# Patient Record
Sex: FEMALE | Race: OTHER | HISPANIC OR LATINO | URBAN - METROPOLITAN AREA
[De-identification: names, ages, dates, MRNs, and addresses within clinical notes are randomized per-mention and may not be internally consistent; named-entity substitution may affect disease eponyms.]

---

## 2017-02-09 ENCOUNTER — EMERGENCY (EMERGENCY)
Facility: HOSPITAL | Age: 27
LOS: 1 days | Discharge: OTHER INSTITUTION | End: 2017-02-09
Attending: STUDENT IN AN ORGANIZED HEALTH CARE EDUCATION/TRAINING PROGRAM
Payer: COMMERCIAL

## 2017-02-09 VITALS
OXYGEN SATURATION: 97 % | TEMPERATURE: 98 F | RESPIRATION RATE: 20 BRPM | DIASTOLIC BLOOD PRESSURE: 82 MMHG | HEART RATE: 102 BPM | HEIGHT: 65 IN | SYSTOLIC BLOOD PRESSURE: 137 MMHG | WEIGHT: 199.96 LBS

## 2017-02-09 DIAGNOSIS — F25.9 SCHIZOAFFECTIVE DISORDER, UNSPECIFIED: ICD-10-CM

## 2017-02-09 DIAGNOSIS — R06.00 DYSPNEA, UNSPECIFIED: ICD-10-CM

## 2017-02-09 DIAGNOSIS — Z88.2 ALLERGY STATUS TO SULFONAMIDES: ICD-10-CM

## 2017-02-09 DIAGNOSIS — J45.909 UNSPECIFIED ASTHMA, UNCOMPLICATED: ICD-10-CM

## 2017-02-09 DIAGNOSIS — Z88.0 ALLERGY STATUS TO PENICILLIN: ICD-10-CM

## 2017-02-09 DIAGNOSIS — Z88.8 ALLERGY STATUS TO OTHER DRUGS, MEDICAMENTS AND BIOLOGICAL SUBSTANCES STATUS: ICD-10-CM

## 2017-02-09 DIAGNOSIS — F41.9 ANXIETY DISORDER, UNSPECIFIED: ICD-10-CM

## 2017-02-09 DIAGNOSIS — R11.10 VOMITING, UNSPECIFIED: ICD-10-CM

## 2017-02-09 DIAGNOSIS — N39.0 URINARY TRACT INFECTION, SITE NOT SPECIFIED: ICD-10-CM

## 2017-02-09 PROCEDURE — 90792 PSYCH DIAG EVAL W/MED SRVCS: CPT | Mod: GT

## 2017-02-09 PROCEDURE — 99284 EMERGENCY DEPT VISIT MOD MDM: CPT | Mod: 25

## 2017-02-09 NOTE — ED ADULT TRIAGE NOTE - CHIEF COMPLAINT QUOTE
have a lot of mental problem feel I cant breath good .  I would like to speak with someone  denies s/i denies h/i.   I am taking my medication.  my feet hurt

## 2017-02-10 ENCOUNTER — INPATIENT (INPATIENT)
Facility: HOSPITAL | Age: 27
LOS: 3 days | Discharge: HOME | End: 2017-02-14
Attending: PSYCHIATRY & NEUROLOGY | Admitting: PSYCHIATRY & NEUROLOGY
Payer: MEDICAID

## 2017-02-10 VITALS
HEART RATE: 96 BPM | TEMPERATURE: 98 F | RESPIRATION RATE: 20 BRPM | SYSTOLIC BLOOD PRESSURE: 104 MMHG | DIASTOLIC BLOOD PRESSURE: 71 MMHG | OXYGEN SATURATION: 99 %

## 2017-02-10 VITALS — WEIGHT: 263.45 LBS | HEIGHT: 65 IN | TEMPERATURE: 98 F

## 2017-02-10 DIAGNOSIS — F29 UNSPECIFIED PSYCHOSIS NOT DUE TO A SUBSTANCE OR KNOWN PHYSIOLOGICAL CONDITION: ICD-10-CM

## 2017-02-10 DIAGNOSIS — F25.9 SCHIZOAFFECTIVE DISORDER, UNSPECIFIED: ICD-10-CM

## 2017-02-10 LAB
ALBUMIN SERPL ELPH-MCNC: 4.2 G/DL — SIGNIFICANT CHANGE UP (ref 3.3–5.2)
ALP SERPL-CCNC: 112 U/L — SIGNIFICANT CHANGE UP (ref 40–120)
ALT FLD-CCNC: 24 U/L — SIGNIFICANT CHANGE UP
ANION GAP SERPL CALC-SCNC: 14 MMOL/L — SIGNIFICANT CHANGE UP (ref 5–17)
APAP SERPL-MCNC: <7.5 UG/ML — LOW (ref 10–26)
APPEARANCE UR: ABNORMAL
AST SERPL-CCNC: 22 U/L — SIGNIFICANT CHANGE UP
BASOPHILS # BLD AUTO: 0 K/UL — SIGNIFICANT CHANGE UP (ref 0–0.2)
BASOPHILS NFR BLD AUTO: 0.5 % — SIGNIFICANT CHANGE UP (ref 0–2)
BILIRUB SERPL-MCNC: 0.2 MG/DL — LOW (ref 0.4–2)
BILIRUB UR-MCNC: NEGATIVE — SIGNIFICANT CHANGE UP
BUN SERPL-MCNC: 8 MG/DL — SIGNIFICANT CHANGE UP (ref 8–20)
CALCIUM SERPL-MCNC: 9.2 MG/DL — SIGNIFICANT CHANGE UP (ref 8.6–10.2)
CHLORIDE SERPL-SCNC: 98 MMOL/L — SIGNIFICANT CHANGE UP (ref 98–107)
CO2 SERPL-SCNC: 23 MMOL/L — SIGNIFICANT CHANGE UP (ref 22–29)
COLOR SPEC: YELLOW — SIGNIFICANT CHANGE UP
CREAT SERPL-MCNC: 0.76 MG/DL — SIGNIFICANT CHANGE UP (ref 0.5–1.3)
DIFF PNL FLD: ABNORMAL
EOSINOPHIL # BLD AUTO: 0.2 K/UL — SIGNIFICANT CHANGE UP (ref 0–0.5)
EOSINOPHIL NFR BLD AUTO: 1.7 % — SIGNIFICANT CHANGE UP (ref 0–6)
ETHANOL SERPL-MCNC: <10 MG/DL — SIGNIFICANT CHANGE UP
GLUCOSE SERPL-MCNC: 109 MG/DL — SIGNIFICANT CHANGE UP (ref 70–115)
GLUCOSE UR QL: NEGATIVE MG/DL — SIGNIFICANT CHANGE UP
HCG UR QL: NEGATIVE — SIGNIFICANT CHANGE UP
HCT VFR BLD CALC: 37.1 % — SIGNIFICANT CHANGE UP (ref 37–47)
HGB BLD-MCNC: 12.4 G/DL — SIGNIFICANT CHANGE UP (ref 12–16)
KETONES UR-MCNC: NEGATIVE — SIGNIFICANT CHANGE UP
LEUKOCYTE ESTERASE UR-ACNC: ABNORMAL
LYMPHOCYTES # BLD AUTO: 28.8 % — SIGNIFICANT CHANGE UP (ref 20–55)
LYMPHOCYTES # BLD AUTO: 3.2 K/UL — SIGNIFICANT CHANGE UP (ref 1–4.8)
MAGNESIUM SERPL-MCNC: 2.1 MG/DL — SIGNIFICANT CHANGE UP (ref 1.8–2.5)
MCHC RBC-ENTMCNC: 27.7 PG — SIGNIFICANT CHANGE UP (ref 27–31)
MCHC RBC-ENTMCNC: 33.4 G/DL — SIGNIFICANT CHANGE UP (ref 32–36)
MCV RBC AUTO: 83 FL — SIGNIFICANT CHANGE UP (ref 81–99)
MONOCYTES # BLD AUTO: 0.7 K/UL — SIGNIFICANT CHANGE UP (ref 0–0.8)
MONOCYTES NFR BLD AUTO: 6.7 % — SIGNIFICANT CHANGE UP (ref 3–10)
NEUTROPHILS # BLD AUTO: 6.8 K/UL — SIGNIFICANT CHANGE UP (ref 1.8–8)
NEUTROPHILS NFR BLD AUTO: 62.1 % — SIGNIFICANT CHANGE UP (ref 37–73)
NITRITE UR-MCNC: NEGATIVE — SIGNIFICANT CHANGE UP
PCP SPEC-MCNC: SIGNIFICANT CHANGE UP
PH UR: 6 — SIGNIFICANT CHANGE UP (ref 4.8–8)
PLATELET # BLD AUTO: 411 K/UL — HIGH (ref 150–400)
POTASSIUM SERPL-MCNC: 3.8 MMOL/L — SIGNIFICANT CHANGE UP (ref 3.5–5.3)
POTASSIUM SERPL-SCNC: 3.8 MMOL/L — SIGNIFICANT CHANGE UP (ref 3.5–5.3)
PROT SERPL-MCNC: 8.3 G/DL — SIGNIFICANT CHANGE UP (ref 6.6–8.7)
PROT UR-MCNC: 15 MG/DL
RBC # BLD: 4.47 M/UL — SIGNIFICANT CHANGE UP (ref 4.4–5.2)
RBC # FLD: 13.8 % — SIGNIFICANT CHANGE UP (ref 11–15.6)
SALICYLATES SERPL-MCNC: <2 MG/DL — LOW (ref 10–20)
SODIUM SERPL-SCNC: 135 MMOL/L — SIGNIFICANT CHANGE UP (ref 135–145)
SP GR SPEC: 1.02 — SIGNIFICANT CHANGE UP (ref 1.01–1.02)
TSH SERPL-MCNC: 4.78 UIU/ML — HIGH (ref 0.27–4.2)
UROBILINOGEN FLD QL: NEGATIVE MG/DL — SIGNIFICANT CHANGE UP
WBC # BLD: 11 K/UL — HIGH (ref 4.8–10.8)
WBC # FLD AUTO: 11 K/UL — HIGH (ref 4.8–10.8)

## 2017-02-10 PROCEDURE — 93010 ELECTROCARDIOGRAM REPORT: CPT

## 2017-02-10 PROCEDURE — 84443 ASSAY THYROID STIM HORMONE: CPT

## 2017-02-10 PROCEDURE — 83735 ASSAY OF MAGNESIUM: CPT

## 2017-02-10 PROCEDURE — 80307 DRUG TEST PRSMV CHEM ANLYZR: CPT

## 2017-02-10 PROCEDURE — 81001 URINALYSIS AUTO W/SCOPE: CPT

## 2017-02-10 PROCEDURE — 99285 EMERGENCY DEPT VISIT HI MDM: CPT | Mod: 25

## 2017-02-10 PROCEDURE — 85027 COMPLETE CBC AUTOMATED: CPT

## 2017-02-10 PROCEDURE — 93005 ELECTROCARDIOGRAM TRACING: CPT

## 2017-02-10 PROCEDURE — 80053 COMPREHEN METABOLIC PANEL: CPT

## 2017-02-10 PROCEDURE — 81025 URINE PREGNANCY TEST: CPT

## 2017-02-10 PROCEDURE — 71046 X-RAY EXAM CHEST 2 VIEWS: CPT

## 2017-02-10 PROCEDURE — T1013: CPT

## 2017-02-10 PROCEDURE — 71020: CPT | Mod: 26

## 2017-02-10 RX ORDER — BENZTROPINE MESYLATE 1 MG
1 TABLET ORAL
Qty: 0 | Refills: 0 | COMMUNITY

## 2017-02-10 RX ORDER — NITROFURANTOIN MACROCRYSTAL 50 MG
100 CAPSULE ORAL ONCE
Qty: 0 | Refills: 0 | Status: COMPLETED | OUTPATIENT
Start: 2017-02-10 | End: 2017-02-10

## 2017-02-10 RX ORDER — HYDROXYZINE HCL 10 MG
25 TABLET ORAL EVERY 4 HOURS
Qty: 0 | Refills: 0 | Status: DISCONTINUED | OUTPATIENT
Start: 2017-02-10 | End: 2017-02-14

## 2017-02-10 RX ORDER — CITALOPRAM 10 MG/1
40 TABLET, FILM COATED ORAL DAILY
Qty: 0 | Refills: 0 | Status: DISCONTINUED | OUTPATIENT
Start: 2017-02-10 | End: 2017-02-14

## 2017-02-10 RX ADMIN — Medication 100 MILLIGRAM(S): at 03:23

## 2017-02-10 RX ADMIN — Medication 1 MILLIGRAM(S): at 03:13

## 2017-02-10 NOTE — ED BEHAVIORAL HEALTH NOTE - BEHAVIORAL HEALTH NOTE
Collateral Contact: Rodrigo Gabriel    [facility] spoke with Rodrigo Murphyrez using [employer] ID#[id number].    -NUMBER: [phone]    -RELATIONSHIP: Boyfriend    -RELIABILITY: Boyfriend lives with patient and has knowledge of history and some presenting issues. Note that boyfriend was not aware of recent cutting or suicidal thoughts, and did not believe that this has occurred for 1.5 years.    -OPINION RE PATIENT RELIABILITY: No concerns noted.  -OPINION RE CONCERN FOR DANGEROUSNESS: Boyfriend reports that patient has seemed more depressed recently, but that patient sees [facility] outpatient provider and has not reported specific stressors.    -AFTERCARE ROLE: Boyfriend will continue to offer support and encouragement to patient.    -PSYCHOEDUCATION: Reviewed role of Emergency department, nature of involuntary vs. voluntary hospitalization, support groups for caregivers.    CORE HISTORY PROVIDED BY: Mahsa  -DEMOGRAPHICS: Patient is a 26 year old  female, domiciled with boyfriend, no children, non-caregiver, unemployed, not attending school.     -DEPENDENTS: None.    -HPI/PAST PSYCH: Patient is diagnosed with Schizoaffective Disorder. Patient sees [facility] Psychiatrist Dr. Mccartney, last appointment was 2/7. No history of inpatient psych hospitalizations. Per boyfriend no trauma history. Patient has history of AH, with voices telling her to hurt herself. No substance use. No history of aggression. No delusions. Per boyfriend, when patient gets depression she cries and “squeezes her toes.”    -SUICIDALITY: Patient has history of suicidal thoughts and cutting. Patient has history of AH, with voices telling her to hurt herself. Boyfriend denies any previous suicide attempts, however patient reported a suicide attempt last year. Boyfriend also reports that the last time patient cut or expressed suicidal thoughts was 1.5 years ago, but patient reported more recent cutting to Telepsychiatry Attending.     -VIOLENCE: None.    -ARRESTS: None.    -SUBSTANCE: None.    -MEDICAL: Asthma, although boyfriend reports that it is controlled and patient no longer takes medication for this.    -MEDICATIONS: Per patient Haldol deconate, Citalopram, and Melatonin.    -TODAY’S ED VISIT: Patient presented to ED with complaint of anxiety.     -SOCIAL HISTORY:  Patient is unemployed. Boyfriend reports no recent significant stressors. No access to guns or weapons.    DISPOSITION: Patient to be transferred on a voluntary 9.13 basis. [facility] inquired with [facility] about bed availability and was informed no beds are available at this time. [facility] spoke with VARUN Caal who confirmed bed availability on Low4 ([phone]).    I have discussed the above information with Telepsychiatry Attending Dr. Segura Collateral Contact: Rodrigo Gabriel    [facility] spoke with Rodrigo Murphyrez using [employer] ID#[id number].    -NUMBER: [phone]    -RELATIONSHIP: Boyfriend    -RELIABILITY: Boyfriend lives with patient and has knowledge of history and some presenting issues. Note that boyfriend was not aware of recent cutting or suicidal thoughts, and did not believe that this has occurred for 1.5 years.    -OPINION RE PATIENT RELIABILITY: No concerns noted.  -OPINION RE CONCERN FOR DANGEROUSNESS: Boyfriend reports that patient has seemed more depressed recently, but that patient sees [facility] outpatient provider and has not reported specific stressors.    -AFTERCARE ROLE: Boyfriend will continue to offer support and encouragement to patient.    -PSYCHOEDUCATION: Reviewed role of Emergency department, nature of involuntary vs. voluntary hospitalization, support groups for caregivers.    CORE HISTORY PROVIDED BY: Mahsa  -DEMOGRAPHICS: Patient is a 26 year old  female, domiciled with boyfriend, no children, non-caregiver, unemployed, not attending school.     -DEPENDENTS: None.    -HPI/PAST PSYCH: Patient is diagnosed with Schizoaffective Disorder. Patient sees [facility] Psychiatrist Dr. Mccartney, last appointment was 2/7. No history of inpatient psych hospitalizations. Per boyfriend no trauma history. Patient has history of AH, with voices telling her to hurt herself. No substance use. No history of aggression. No delusions. Per boyfriend, when patient gets depression she cries and “squeezes her toes.”    -SUICIDALITY: Patient has history of suicidal thoughts and cutting. Patient has history of AH, with voices telling her to hurt herself. Boyfriend denies any previous suicide attempts, however patient reported a suicide attempt last year. Boyfriend also reports that the last time patient cut or expressed suicidal thoughts was 1.5 years ago, but patient reported more recent cutting to Telepsychiatry Attending.     -VIOLENCE: None.    -ARRESTS: None.    -SUBSTANCE: None.    -MEDICAL: Asthma, although boyfriend reports that it is controlled and patient no longer takes medication for this.    -MEDICATIONS: Per patient Haldol deconate, Citalopram, and Melatonin.    -TODAY’S ED VISIT: Patient presented to ED with complaint of anxiety.     -SOCIAL HISTORY:  Patient is unemployed. Boyfriend reports no recent significant stressors. No access to guns or weapons.    DISPOSITION: Patient to be transferred on a voluntary 9.13 basis. [facility] inquired with [facility] about bed availability and was informed no beds are available at this time. [facility] spoke with [facility] who confirmed bed availability on Low4 ([phone]). # for Low4 provided to [facility] RN.    I have discussed the above information with Telepsychiatry Attending Dr. Segura

## 2017-02-10 NOTE — ED BEHAVIORAL HEALTH ASSESSMENT NOTE - MOOD
Date: 5/1/2024    Patient Name: Amy Rosario          To Whom it may concern:    This letter has been written at the patient's request. The above patient was seen at [facility] for treatment of a medical condition.     .    The patient may return to work light duty with the following limitations limited walking, allow for resting and elevating affected extremity.  .        Sincerely,    Adeola Quintero DPM       Depressed

## 2017-02-10 NOTE — ED BEHAVIORAL HEALTH ASSESSMENT NOTE - HPI (INCLUDE ILLNESS QUALITY, SEVERITY, DURATION, TIMING, CONTEXT, MODIFYING FACTORS, ASSOCIATED SIGNS AND SYMPTOMS)
This is a 26 y.o. with pph of schizoaffective disorder, domiciled with boyfriend she is in outpatient treatment with Dr Mccartney at [facility], no past psychiatric hospitalizations , 2 past suicide attempts, last one 5 months ago with cutting wrist,  presents today with anxiety, with auditory hallucinations and suicidal ideations.     Patient states that she had a panic attack at 8 pm and she has had them multiple tomes in the past, but she had suicidal ideations today, and heard a voice telling her to hurt herself and that people don't like her.  She reports depression over the past 2 weeks, with decrease in energy and increase in sleep and appetite.  She states that she received Haldol deconate on Tuesday.  She states that she usually helps her niece in the morning but has struggled the past week.  She states that she has a history of 2 suicide attempts a year ago she took 7 pills of haldol, and went to sleep , and never told anybody.  5 months ago she attempted to cut her wrist to kill herself and never reported attempt.  At this time patient continues to have passive suicidal ideations.  Patient agrees to voluntarily sign herself into the hospital for Depressive symptoms, at this time she is an imminent  danger to herself.

## 2017-02-10 NOTE — ED BEHAVIORAL HEALTH ASSESSMENT NOTE - SUMMARY
26 y.o. female with PPH of schizoaffective disorder treated outpatient for the past year, history of no past psychiatric hospitalizations.  Patient has worsening of depression with suicidal ideations, with command auditory hallucinations. She has passive suicidal ideations at this time.

## 2017-02-10 NOTE — ED PROVIDER NOTE - CARE PLAN
Principal Discharge DX:	Anxiety  Secondary Diagnosis:	UTI (urinary tract infection) Principal Discharge DX:	Schizoaffective disorder, unspecified type  Secondary Diagnosis:	UTI (urinary tract infection)

## 2017-02-10 NOTE — ED BEHAVIORAL HEALTH NOTE - BEHAVIORAL HEALTH NOTE
ALIDA telephoned [employer] [facility] [phone] and spoke with Narcisa Landrum for 2 days, Auth#[id number]. Follow up with Oksana Plunkett [phone].

## 2017-02-10 NOTE — ED ADULT NURSE REASSESSMENT NOTE - NS ED NURSE REASSESS COMMENT FT1
pt received asleep ,easily awakened, breathing even and unlaboured, pt answers questions appropriately. pt states she feels a little better. no anxiety noted at this time , pt awaiting transfer to [facility] [place]. Environment checked to protect pts safety ,will continue to monitor
Request for tele pysch faxed and confirmed received by Reanna CALLAWAY.

## 2017-02-10 NOTE — ED BEHAVIORAL HEALTH ASSESSMENT NOTE - DESCRIPTION
calm and ioana Obese, UTI patient lives with boyfriend of 2 years, has a niece, no history of abuse.

## 2017-02-10 NOTE — ED PROVIDER NOTE - NS ED MD SCRIBE ATTENDING SCRIBE SECTIONS
DISPOSITION/PAST MEDICAL/SURGICAL/SOCIAL HISTORY/REVIEW OF SYSTEMS/HIV/PHYSICAL EXAM/HISTORY OF PRESENT ILLNESS/VITAL SIGNS( Pullset)

## 2017-02-10 NOTE — ED ADULT NURSE NOTE - OBJECTIVE STATEMENT
Pt brought in by boyfriend. AOx3. Pt complaining of SOB, HA, insomnia, toes cramping. Pt reports having these symptoms in the past while having panic attacks. Pt has a medical hx of asthma. Pt reports having A/V/H earlier on today. Pt reports hearing voices saying "To kill yourself". Pt reports on seeing shadows. Pt has   A/V/H weekly.  Pt has a hx of schizoaffective disorder, bipolar, and depression.  Pt reports that "she feels like she is going to die." which is what triggered her anxiety. Pt reports her medicine (Haldol injections) helps lessen her A/V/H symptoms.  Pt lives with her boyfriend and is currently unemployed. Pt has a hx of cutting wrists. Pt has a suicide attempt last year. Pt reports taking 17pills of Haldol and slept it off. Pt had a hx of anorexia for 7 years. Pt denies any current SI/HI. Will continue to monitor the pt for safety. Pt brought in by boyfriend. AOx3. Pt complaining of SOB, HA, insomnia, toes cramping. Pt reports having these symptoms in the past while having panic attacks. Pt has a medical hx of asthma. Pt reports having A/V/H earlier on today. Pt reports hearing voices saying "To kill yourself". Pt reports on seeing shadows. Pt has   A/V/H weekly.  Pt has a hx of schizoaffective disorder, bipolar, and depression.  Pt reports that "she feels like she is going to die." which is what triggered her anxiety. Pt reports her medicine (Haldol injections) helps lessen her A/V/H symptoms.  Pt lives with her boyfriend and is currently unemployed. Pt has a hx of cutting wrists. Pt has a suicide attempt last year. Pt reports taking 17pills of Haldol and slept it off. Pt had a hx of anorexia for 7 years Pt goes to [employer] [facility]  and sees . Pt denies any current SI/HI. Will continue to monitor the pt for safety.

## 2017-02-10 NOTE — ED PROVIDER NOTE - NEUROLOGICAL, MLM
Alert and oriented, no focal deficits, no motor or sensory deficits. CN II - XII intact, normal gait.

## 2017-02-10 NOTE — ED BEHAVIORAL HEALTH ASSESSMENT NOTE - RISK ASSESSMENT
Pt has protective factors of help seeking behavior, supportive boyfriend, although risk factors of worsening depression, history of suicide attempts, and command auditory hallucinations .

## 2017-02-10 NOTE — ED PROVIDER NOTE - OBJECTIVE STATEMENT
27 y/o female c/o difficulty breathing x 2 hours. Sx were 9/10 intensity at home but currently a 5/10 intensity. Pt also had 2 episodes of associated vomiting. Denies CP, abd pain, back pain. Pt is also requesting psychiatric evaluation for management of anxiety. Pt has h/o bipolar disorder, schizoaffective disorder, depression. She has been taking Haldol and other psychiatric medications but denies any anxiety-specific medications. No further complaints at this time. 27 y/o female c/o difficulty breathing x 2 hours. Sx were 9/10 intensity at home but currently a 5/10 intensity. Pt also had 2 episodes of associated vomiting. Denies CP, abd pain, back pain. Pt is also concerned about chronic cramping foot pains x 2 months. She says she has seen her PCP and told the pain just needs to be "stretched out." Pt is also requesting psychiatric evaluation for management of anxiety. Pt has h/o bipolar disorder, schizoaffective disorder, depression. She has been taking Haldol and other psychiatric medications but denies any anxiety-specific medications. No further complaints at this time. 25 y/o female c/o difficulty breathing x 2 hours; described as the air feeling heavy. Sx were 9/10 intensity at home but currently a 5/10 intensity. Pt also had 2 episodes of associated vomiting. Denies CP, abd pain, back pain. Pt is also concerned about chronic cramping foot pains x 2 months. She says she has seen her PCP and told the pain just needs to be "stretched out." Pt is also requesting psychiatric evaluation for management of anxiety. Pt has h/o bipolar disorder, schizoaffective disorder, depression. She has been taking Haldol and other psychiatric medications but denies any anxiety-specific medications. No further complaints at this time.

## 2017-02-10 NOTE — ED BEHAVIORAL HEALTH ASSESSMENT NOTE - OTHER PAST PSYCHIATRIC HISTORY (INCLUDE DETAILS REGARDING ONSET, COURSE OF ILLNESS, INPATIENT/OUTPATIENT TREATMENT)
outpatient treatment at [facility], began treatment 1 year ago. never had psychiatric hospitalization

## 2017-02-10 NOTE — ED PROVIDER NOTE - PROGRESS NOTE DETAILS
Patient noted with mild UTI. Lbs are as otherwise noted. Patient is cleared for discharge home or inpatient psychiatric admission. Awaiting Psychiatric consultation. Case discussed with Dr. Segura. Patient to sign in voluntary to assist with her condition. Patient accepted to [facility]. Accepting will be Dr. Duque.

## 2017-02-11 PROCEDURE — 99232 SBSQ HOSP IP/OBS MODERATE 35: CPT

## 2017-02-11 RX ADMIN — CITALOPRAM 40 MILLIGRAM(S): 10 TABLET, FILM COATED ORAL at 08:15

## 2017-02-12 PROCEDURE — 99232 SBSQ HOSP IP/OBS MODERATE 35: CPT

## 2017-02-12 RX ADMIN — CITALOPRAM 40 MILLIGRAM(S): 10 TABLET, FILM COATED ORAL at 08:44

## 2017-02-13 LAB
APPEARANCE UR: SIGNIFICANT CHANGE UP
BACTERIA # UR AUTO: SIGNIFICANT CHANGE UP
BILIRUB UR-MCNC: NEGATIVE — SIGNIFICANT CHANGE UP
BLOOD UR QL VISUAL: HIGH
COLOR SPEC: YELLOW — SIGNIFICANT CHANGE UP
GLUCOSE UR-MCNC: NEGATIVE — SIGNIFICANT CHANGE UP
KETONES UR-MCNC: NEGATIVE — SIGNIFICANT CHANGE UP
LEUKOCYTE ESTERASE UR-ACNC: HIGH
NITRITE UR-MCNC: NEGATIVE — SIGNIFICANT CHANGE UP
NON-SQ EPI CELLS # UR AUTO: 1 — SIGNIFICANT CHANGE UP
PH UR: 6 — SIGNIFICANT CHANGE UP (ref 4.6–8)
PROT UR-MCNC: 30 — HIGH
RBC CASTS # UR COMP ASSIST: >50 — HIGH (ref 0–?)
SP GR SPEC: 1.01 — SIGNIFICANT CHANGE UP (ref 1–1.03)
SQUAMOUS # UR AUTO: SIGNIFICANT CHANGE UP
UROBILINOGEN FLD QL: NORMAL E.U. — SIGNIFICANT CHANGE UP (ref 0.1–0.2)
WBC UR QL: SIGNIFICANT CHANGE UP (ref 0–?)

## 2017-02-13 RX ORDER — BENZTROPINE MESYLATE 1 MG
1 TABLET ORAL
Qty: 0 | Refills: 0 | Status: DISCONTINUED | OUTPATIENT
Start: 2017-02-13 | End: 2017-02-14

## 2017-02-13 RX ORDER — CITALOPRAM 10 MG/1
1 TABLET, FILM COATED ORAL
Qty: 0 | Refills: 0 | COMMUNITY
Start: 2017-02-13

## 2017-02-13 RX ORDER — BENZTROPINE MESYLATE 1 MG
1 TABLET ORAL ONCE
Qty: 0 | Refills: 0 | Status: COMPLETED | OUTPATIENT
Start: 2017-02-13 | End: 2017-02-13

## 2017-02-13 RX ORDER — CITALOPRAM 10 MG/1
30 TABLET, FILM COATED ORAL
Qty: 0 | Refills: 0 | COMMUNITY

## 2017-02-13 RX ORDER — ONDANSETRON 8 MG/1
4 TABLET, FILM COATED ORAL EVERY 6 HOURS
Qty: 0 | Refills: 0 | Status: DISCONTINUED | OUTPATIENT
Start: 2017-02-13 | End: 2017-02-14

## 2017-02-13 RX ORDER — ACETAMINOPHEN 500 MG
650 TABLET ORAL ONCE
Qty: 0 | Refills: 0 | Status: COMPLETED | OUTPATIENT
Start: 2017-02-13 | End: 2017-02-13

## 2017-02-13 RX ADMIN — ONDANSETRON 4 MILLIGRAM(S): 8 TABLET, FILM COATED ORAL at 21:32

## 2017-02-13 RX ADMIN — Medication 1 MILLIGRAM(S): at 17:39

## 2017-02-13 RX ADMIN — Medication 1 MILLIGRAM(S): at 20:26

## 2017-02-13 RX ADMIN — Medication 25 MILLIGRAM(S): at 19:20

## 2017-02-13 RX ADMIN — Medication 650 MILLIGRAM(S): at 22:20

## 2017-02-13 RX ADMIN — Medication 650 MILLIGRAM(S): at 21:28

## 2017-02-13 RX ADMIN — CITALOPRAM 40 MILLIGRAM(S): 10 TABLET, FILM COATED ORAL at 08:40

## 2017-02-14 VITALS — TEMPERATURE: 98 F

## 2017-02-14 LAB
ALBUMIN SERPL ELPH-MCNC: 3.9 G/DL — SIGNIFICANT CHANGE UP (ref 3.3–5)
ALP SERPL-CCNC: 96 U/L — SIGNIFICANT CHANGE UP (ref 40–120)
ALT FLD-CCNC: 30 U/L — SIGNIFICANT CHANGE UP (ref 4–33)
AST SERPL-CCNC: 23 U/L — SIGNIFICANT CHANGE UP (ref 4–32)
BASOPHILS # BLD AUTO: 0.03 K/UL — SIGNIFICANT CHANGE UP (ref 0–0.2)
BASOPHILS NFR BLD AUTO: 0.3 % — SIGNIFICANT CHANGE UP (ref 0–2)
BILIRUB SERPL-MCNC: 0.3 MG/DL — SIGNIFICANT CHANGE UP (ref 0.2–1.2)
BUN SERPL-MCNC: 6 MG/DL — LOW (ref 7–23)
CALCIUM SERPL-MCNC: 9.3 MG/DL — SIGNIFICANT CHANGE UP (ref 8.4–10.5)
CHLORIDE SERPL-SCNC: 102 MMOL/L — SIGNIFICANT CHANGE UP (ref 98–107)
CO2 SERPL-SCNC: 22 MMOL/L — SIGNIFICANT CHANGE UP (ref 22–31)
CREAT SERPL-MCNC: 0.69 MG/DL — SIGNIFICANT CHANGE UP (ref 0.5–1.3)
EOSINOPHIL # BLD AUTO: 0.14 K/UL — SIGNIFICANT CHANGE UP (ref 0–0.5)
EOSINOPHIL NFR BLD AUTO: 1.6 % — SIGNIFICANT CHANGE UP (ref 0–6)
GLUCOSE SERPL-MCNC: 104 MG/DL — HIGH (ref 70–99)
HCT VFR BLD CALC: 39.2 % — SIGNIFICANT CHANGE UP (ref 34.5–45)
HGB BLD-MCNC: 12.7 G/DL — SIGNIFICANT CHANGE UP (ref 11.5–15.5)
IMM GRANULOCYTES NFR BLD AUTO: 0.1 % — SIGNIFICANT CHANGE UP (ref 0–1.5)
LYMPHOCYTES # BLD AUTO: 2.71 K/UL — SIGNIFICANT CHANGE UP (ref 1–3.3)
LYMPHOCYTES # BLD AUTO: 31.5 % — SIGNIFICANT CHANGE UP (ref 13–44)
MCHC RBC-ENTMCNC: 27.5 PG — SIGNIFICANT CHANGE UP (ref 27–34)
MCHC RBC-ENTMCNC: 32.4 % — SIGNIFICANT CHANGE UP (ref 32–36)
MCV RBC AUTO: 84.8 FL — SIGNIFICANT CHANGE UP (ref 80–100)
MONOCYTES # BLD AUTO: 0.43 K/UL — SIGNIFICANT CHANGE UP (ref 0–0.9)
MONOCYTES NFR BLD AUTO: 5 % — SIGNIFICANT CHANGE UP (ref 2–14)
NEUTROPHILS # BLD AUTO: 5.29 K/UL — SIGNIFICANT CHANGE UP (ref 1.8–7.4)
NEUTROPHILS NFR BLD AUTO: 61.5 % — SIGNIFICANT CHANGE UP (ref 43–77)
PLATELET # BLD AUTO: 373 K/UL — SIGNIFICANT CHANGE UP (ref 150–400)
PMV BLD: 9.4 FL — SIGNIFICANT CHANGE UP (ref 7–13)
POTASSIUM SERPL-MCNC: 3.9 MMOL/L — SIGNIFICANT CHANGE UP (ref 3.5–5.3)
POTASSIUM SERPL-SCNC: 3.9 MMOL/L — SIGNIFICANT CHANGE UP (ref 3.5–5.3)
PROT SERPL-MCNC: 7.9 G/DL — SIGNIFICANT CHANGE UP (ref 6–8.3)
RBC # BLD: 4.62 M/UL — SIGNIFICANT CHANGE UP (ref 3.8–5.2)
RBC # FLD: 13.8 % — SIGNIFICANT CHANGE UP (ref 10.3–14.5)
SODIUM SERPL-SCNC: 140 MMOL/L — SIGNIFICANT CHANGE UP (ref 135–145)
WBC # BLD: 8.61 K/UL — SIGNIFICANT CHANGE UP (ref 3.8–10.5)
WBC # FLD AUTO: 8.61 K/UL — SIGNIFICANT CHANGE UP (ref 3.8–10.5)

## 2017-02-14 RX ORDER — BENZTROPINE MESYLATE 1 MG
1 TABLET ORAL
Qty: 60 | Refills: 0
Start: 2017-02-14 | End: 2017-03-16

## 2017-02-14 RX ORDER — NITROFURANTOIN MACROCRYSTAL 50 MG
1 CAPSULE ORAL
Qty: 14 | Refills: 0
Start: 2017-02-14 | End: 2017-02-21

## 2017-02-14 RX ORDER — BENZTROPINE MESYLATE 1 MG
1 TABLET ORAL
Qty: 0 | Refills: 0 | COMMUNITY

## 2017-02-14 RX ORDER — CITALOPRAM 10 MG/1
1 TABLET, FILM COATED ORAL
Qty: 30 | Refills: 0
Start: 2017-02-14 | End: 2017-03-16

## 2017-02-14 RX ADMIN — Medication 1 MILLIGRAM(S): at 08:33

## 2017-02-14 RX ADMIN — CITALOPRAM 40 MILLIGRAM(S): 10 TABLET, FILM COATED ORAL at 08:33

## 2017-12-20 PROBLEM — Z00.00 ENCOUNTER FOR PREVENTIVE HEALTH EXAMINATION: Status: ACTIVE | Noted: 2017-12-20

## 2018-01-19 ENCOUNTER — APPOINTMENT (OUTPATIENT)
Dept: GASTROENTEROLOGY | Facility: CLINIC | Age: 28
End: 2018-01-19

## 2018-02-07 NOTE — ED ADULT NURSE NOTE - AS SC BRADEN NUTRITION
LMTCB transfer to triage      ----- Message from Anna Mejias MD sent at 1/31/2018  3:54 PM CST -----  Labs are normal except for low vitamin D levels.  Should take over the counter vitamin D 50,000 units weekly again because continues to go low- when d
Patient informed of results (identified name and ) and recommendations in Maltese, as per Dr. Crispin Swenson result note copied below. Pt states already saw Gi and has colonoscopy scheduled. Agrees to continue supplements as per LB's recommendations.
(4) excellent

## 2018-12-24 ENCOUNTER — APPOINTMENT (OUTPATIENT)
Dept: NEUROLOGY | Facility: CLINIC | Age: 28
End: 2018-12-24

## 2019-01-16 ENCOUNTER — TRANSCRIPTION ENCOUNTER (OUTPATIENT)
Age: 29
End: 2019-01-16

## 2019-02-09 NOTE — ED PROVIDER NOTE - EYES NEGATIVE STATEMENT, MLM
2/9/2019  4:27 PM    Chief Complaint   Patient presents with    Medication Refill       Noted refill request for Flonase. Refilled. no discharge, no irritation, no pain, no redness, and no visual changes.

## 2019-03-28 ENCOUNTER — EMERGENCY (EMERGENCY)
Facility: HOSPITAL | Age: 29
LOS: 1 days | Discharge: HOME | End: 2019-03-28
Attending: EMERGENCY MEDICINE
Payer: COMMERCIAL

## 2019-03-28 VITALS
HEART RATE: 95 BPM | RESPIRATION RATE: 20 BRPM | WEIGHT: 250 LBS | OXYGEN SATURATION: 97 % | SYSTOLIC BLOOD PRESSURE: 135 MMHG | TEMPERATURE: 97 F | DIASTOLIC BLOOD PRESSURE: 74 MMHG | HEIGHT: 64 IN

## 2019-03-28 LAB
ALBUMIN SERPL ELPH-MCNC: 4.1 G/DL — SIGNIFICANT CHANGE UP (ref 3.3–5.2)
ALP SERPL-CCNC: 104 U/L — SIGNIFICANT CHANGE UP (ref 40–120)
ALT FLD-CCNC: 37 U/L — HIGH
ANION GAP SERPL CALC-SCNC: 11 MMOL/L — SIGNIFICANT CHANGE UP (ref 5–17)
APPEARANCE UR: CLEAR — SIGNIFICANT CHANGE UP
AST SERPL-CCNC: 25 U/L — SIGNIFICANT CHANGE UP
BACTERIA # UR AUTO: ABNORMAL
BASOPHILS # BLD AUTO: 0 K/UL — SIGNIFICANT CHANGE UP (ref 0–0.2)
BASOPHILS NFR BLD AUTO: 0.5 % — SIGNIFICANT CHANGE UP (ref 0–2)
BILIRUB SERPL-MCNC: <0.2 MG/DL — LOW (ref 0.4–2)
BILIRUB UR-MCNC: NEGATIVE — SIGNIFICANT CHANGE UP
BUN SERPL-MCNC: 10 MG/DL — SIGNIFICANT CHANGE UP (ref 8–20)
CALCIUM SERPL-MCNC: 9.3 MG/DL — SIGNIFICANT CHANGE UP (ref 8.6–10.2)
CHLORIDE SERPL-SCNC: 100 MMOL/L — SIGNIFICANT CHANGE UP (ref 98–107)
CO2 SERPL-SCNC: 25 MMOL/L — SIGNIFICANT CHANGE UP (ref 22–29)
COLOR SPEC: YELLOW — SIGNIFICANT CHANGE UP
CREAT SERPL-MCNC: 0.54 MG/DL — SIGNIFICANT CHANGE UP (ref 0.5–1.3)
DIFF PNL FLD: ABNORMAL
EOSINOPHIL # BLD AUTO: 0.3 K/UL — SIGNIFICANT CHANGE UP (ref 0–0.5)
EOSINOPHIL NFR BLD AUTO: 2.7 % — SIGNIFICANT CHANGE UP (ref 0–6)
EPI CELLS # UR: ABNORMAL
GLUCOSE SERPL-MCNC: 114 MG/DL — SIGNIFICANT CHANGE UP (ref 70–115)
GLUCOSE UR QL: NEGATIVE MG/DL — SIGNIFICANT CHANGE UP
HCG UR QL: NEGATIVE — SIGNIFICANT CHANGE UP
HCT VFR BLD CALC: 39.5 % — SIGNIFICANT CHANGE UP (ref 37–47)
HGB BLD-MCNC: 12.5 G/DL — SIGNIFICANT CHANGE UP (ref 12–16)
KETONES UR-MCNC: NEGATIVE — SIGNIFICANT CHANGE UP
LEUKOCYTE ESTERASE UR-ACNC: ABNORMAL
LYMPHOCYTES # BLD AUTO: 2.9 K/UL — SIGNIFICANT CHANGE UP (ref 1–4.8)
LYMPHOCYTES # BLD AUTO: 29.7 % — SIGNIFICANT CHANGE UP (ref 20–55)
MCHC RBC-ENTMCNC: 27.1 PG — SIGNIFICANT CHANGE UP (ref 27–31)
MCHC RBC-ENTMCNC: 31.6 G/DL — LOW (ref 32–36)
MCV RBC AUTO: 85.7 FL — SIGNIFICANT CHANGE UP (ref 81–99)
MONOCYTES # BLD AUTO: 0.6 K/UL — SIGNIFICANT CHANGE UP (ref 0–0.8)
MONOCYTES NFR BLD AUTO: 6.3 % — SIGNIFICANT CHANGE UP (ref 3–10)
NEUTROPHILS # BLD AUTO: 5.9 K/UL — SIGNIFICANT CHANGE UP (ref 1.8–8)
NEUTROPHILS NFR BLD AUTO: 60.6 % — SIGNIFICANT CHANGE UP (ref 37–73)
NITRITE UR-MCNC: NEGATIVE — SIGNIFICANT CHANGE UP
PH UR: 6.5 — SIGNIFICANT CHANGE UP (ref 5–8)
PLATELET # BLD AUTO: 359 K/UL — SIGNIFICANT CHANGE UP (ref 150–400)
POTASSIUM SERPL-MCNC: 4 MMOL/L — SIGNIFICANT CHANGE UP (ref 3.5–5.3)
POTASSIUM SERPL-SCNC: 4 MMOL/L — SIGNIFICANT CHANGE UP (ref 3.5–5.3)
PROT SERPL-MCNC: 7.7 G/DL — SIGNIFICANT CHANGE UP (ref 6.6–8.7)
PROT UR-MCNC: 30 MG/DL
RBC # BLD: 4.61 M/UL — SIGNIFICANT CHANGE UP (ref 4.4–5.2)
RBC # FLD: 13.8 % — SIGNIFICANT CHANGE UP (ref 11–15.6)
RBC CASTS # UR COMP ASSIST: ABNORMAL /HPF (ref 0–4)
SODIUM SERPL-SCNC: 136 MMOL/L — SIGNIFICANT CHANGE UP (ref 135–145)
SP GR SPEC: 1.01 — SIGNIFICANT CHANGE UP (ref 1.01–1.02)
UROBILINOGEN FLD QL: NEGATIVE MG/DL — SIGNIFICANT CHANGE UP
WBC # BLD: 9.8 K/UL — SIGNIFICANT CHANGE UP (ref 4.8–10.8)
WBC # FLD AUTO: 9.8 K/UL — SIGNIFICANT CHANGE UP (ref 4.8–10.8)
WBC UR QL: ABNORMAL

## 2019-03-28 PROCEDURE — 99284 EMERGENCY DEPT VISIT MOD MDM: CPT

## 2019-03-28 RX ORDER — ACETAMINOPHEN 500 MG
975 TABLET ORAL ONCE
Qty: 0 | Refills: 0 | Status: COMPLETED | OUTPATIENT
Start: 2019-03-28 | End: 2019-03-28

## 2019-03-28 RX ADMIN — Medication 975 MILLIGRAM(S): at 21:46

## 2019-03-28 NOTE — ED ADULT TRIAGE NOTE - CHIEF COMPLAINT QUOTE
Patient arrived to ED today with c/o left sided abdominal pain for the past month.  Patient states vaginal bleeding also for the past month.

## 2019-03-28 NOTE — ED STATDOCS - OBJECTIVE STATEMENT
27 y/o F pt with hx of ovarian cysts, schizo-effective and bipolar disorder presents to ED c/o gradual onset ABD pain and vaginal bleeding (spotting) x 1 month. Describes the pain as similar to ovarian cysts. Reports irregular menses; did not have menses for 1 year prior this month. Denies chance of pregnancy. No further complaints at this time.

## 2019-03-28 NOTE — ED STATDOCS - CLINICAL SUMMARY MEDICAL DECISION MAKING FREE TEXT BOX
Patient with irregular vaginal bleeding for the past year with 1 month of vaginal bleeding. Also presents with left adnexal pain with hx of ovarian cysts; will eval blood tests ,pregnancy US to r/o ovarian cyst. Patient with irregular vaginal bleeding for the past year, with 1 month of vaginal bleeding. Also presents with left adnexal pain with hx of ovarian cysts; will eval with blood tests, pregnancy test and US to r/o ovarian cyst.

## 2019-03-28 NOTE — ED STATDOCS - CARE PLAN
Principal Discharge DX:	Vaginal bleeding  Secondary Diagnosis:	Abdominal pain  Secondary Diagnosis:	Dermoid

## 2019-03-28 NOTE — ED STATDOCS - PROGRESS NOTE DETAILS
YAN Fowler NOTE: Pt evaluated at bedside. Pt reports hx of ovarian cysts with similar pain in past.  Pt has been spotting x 1 month, not passing clots. Pt evaluated prior by intake physician. Otherwise HPI/PE/ROS as noted above. Will follow up plan per intake physician YAN Fowler NOTE: Pt pending US, informed of plan YAN Fowler NOTE: Left message for GYN resident, awaiting call back. YAN Fowler NOTE: US revealing large right sided cyst, pt poor historian, reporting mild pain still.  Pt seen and examined with Dr. Lacy, per attending will do CT abd/pelvis to further eval cyst and GYN consult YAN Fowler NOTE: Per GYN pt okay to follow up outpatient for cyst.  Abd soft, mild left lower quadrant TTP without rebound/guarding, no CVAT.  Will tx UTI and have pt f/u YAN Fowler NOTE: Patient stable for discharge, reports improvement in pain, vital signs stable, tolerating PO, ambulatory in ED.  Discussion with pt includes but is not limited to: results, plan, proper medication use and side effects, and return precautions. Patient will follow up with their PMD in 1-2 days and any specialists discussed. Advised patient to seek immediate medical attention for any new/worsening symptoms or concerns.  Patient provided with ample opportunity to ask questions which were answered to the fullest extent.  Patient given printed copies of lab/radiology results to aid in proper outpatient follow up. Patient verbalized understanding and agreement of plan.

## 2019-03-29 VITALS
OXYGEN SATURATION: 99 % | HEART RATE: 78 BPM | DIASTOLIC BLOOD PRESSURE: 79 MMHG | SYSTOLIC BLOOD PRESSURE: 122 MMHG | TEMPERATURE: 98 F | RESPIRATION RATE: 18 BRPM

## 2019-03-29 DIAGNOSIS — D36.9 BENIGN NEOPLASM, UNSPECIFIED SITE: ICD-10-CM

## 2019-03-29 PROCEDURE — 76856 US EXAM PELVIC COMPLETE: CPT

## 2019-03-29 PROCEDURE — 85027 COMPLETE CBC AUTOMATED: CPT

## 2019-03-29 PROCEDURE — 74177 CT ABD & PELVIS W/CONTRAST: CPT | Mod: 26

## 2019-03-29 PROCEDURE — 76856 US EXAM PELVIC COMPLETE: CPT | Mod: 26

## 2019-03-29 PROCEDURE — 81001 URINALYSIS AUTO W/SCOPE: CPT

## 2019-03-29 PROCEDURE — 99284 EMERGENCY DEPT VISIT MOD MDM: CPT

## 2019-03-29 PROCEDURE — 36415 COLL VENOUS BLD VENIPUNCTURE: CPT

## 2019-03-29 PROCEDURE — T1013: CPT

## 2019-03-29 PROCEDURE — 76830 TRANSVAGINAL US NON-OB: CPT

## 2019-03-29 PROCEDURE — 76830 TRANSVAGINAL US NON-OB: CPT | Mod: 26

## 2019-03-29 PROCEDURE — 74177 CT ABD & PELVIS W/CONTRAST: CPT

## 2019-03-29 PROCEDURE — 81025 URINE PREGNANCY TEST: CPT

## 2019-03-29 PROCEDURE — 80053 COMPREHEN METABOLIC PANEL: CPT

## 2019-03-29 RX ORDER — NITROFURANTOIN MACROCRYSTAL 50 MG
100 CAPSULE ORAL ONCE
Qty: 0 | Refills: 0 | Status: COMPLETED | OUTPATIENT
Start: 2019-03-29 | End: 2019-03-29

## 2019-03-29 RX ORDER — NITROFURANTOIN MACROCRYSTAL 50 MG
1 CAPSULE ORAL
Qty: 14 | Refills: 0
Start: 2019-03-29 | End: 2019-04-04

## 2019-03-29 RX ADMIN — Medication 100 MILLIGRAM(S): at 00:18

## 2019-03-29 NOTE — CONSULT NOTE ADULT - ASSESSMENT
28y G0 LMP 4 weeks ago currently spotting with known history of R ovarian cyst with pain likely related to cyst rupture. This is supported by the mild pelvic fluid noted on sono and the leaking or ruptured dermoid cyst on the R.  No acute abdomen. VSS.    Pt can be DCed home with f/u with an OBGYN in 2-3 weeks.  Motrin or Tylenol PRN.     Pt seen and examined with Dr. Bo 28y G0 LMP 4 weeks ago with hx of abnormal menses, morbidly obese currently spotting with known history of R ovarian cyst with pain likely related to cyst rupture. This is supported by the mild pelvic fluid noted on sono and the leaking or ruptured dermoid cyst on the R.  No acute abdomen. VSS.    Pt can be DCed home with f/u with an OBGYN in 2-3 weeks.  Motrin or Tylenol PRN.     Pt seen and examined with Dr. Bo  ATTENDING NOTE  Patient was seen and examined with the resident. On exam, morbidly obese abdomen, soft nontender, no rebound, no guarding. Patient clinically stable.  I do not suspect ovarian torsion at this time,  Precautions given to the patient  I agree with above resident assessent and plan  PORFIRIO Bo MD

## 2019-03-29 NOTE — CONSULT NOTE ADULT - SUBJECTIVE AND OBJECTIVE BOX
Name: JARAD KOHLER  MRN: [id number]  Allergies: Motrin (Angioedema)  penicillin (Angioedema)  sulfa drugs (Unknown)      JARAD KOHLER 28y G0  LMP __ presenting with     PAST OBSTETRICAL HISTORY:    PAST GYN HISTORY: Denies history of abormal paps, STDs, ovarian cysts, or uterine fibroids.   Menarche at , regular cycles q28-30d, 4-7d bleeding  PAST MEDICAL HISTORY:  Ovarian cyst  Asthma    PAST SURGICAL HISTORY:    Home Medications:  Haldol: milligram(s) injectable once a month (10 Feb 2017 01:51)      SOCIAL:  Denies tobacco or drug use. Social drinker. Feels safe at home.     HOSPITAL MEDS:    Allergies    Motrin (Angioedema)  penicillin (Angioedema)  sulfa drugs (Unknown)    Intolerances        Vital Signs Last 24 Hrs  T(C): 36.7 (28 Mar 2019 23:59), Max: 36.7 (28 Mar 2019 23:59)  T(F): 98 (28 Mar 2019 23:59), Max: 98 (28 Mar 2019 23:59)  HR: 86 (28 Mar 2019 23:59) (86 - 95)  BP: 115/77 (28 Mar 2019 23:59) (115/77 - 135/74)  BP(mean): --  RR: 20 (28 Mar 2019 23:59) (20 - 20)  SpO2: 97% (28 Mar 2019 23:59) (97% - 97%)    PHYSICAL EXAM:  GEN: NAD, AO   Lung: CTABL, no WRR. Speaking in full sentences without shortness of breath.  CV: S1S2, RRR.  Abd: Soft, Nontender, Nondistended. No rebound tenderness or guarding. Bowel sounds present  PELVIC:        EXTERNAL GENITALIA: atraumatic, no lesions        VAGINA: No disharge or active bleeding         CERVIX: Pink, closed        UTERUS: appropriate size        ADNEXA: not palpable    LABS:                        12.5   9.8   )-----------( 359      ( 28 Mar 2019 20:54 )             39.5         136  |  100  |  10.0  ----------------------------<  114  4.0   |  25.0  |  0.54    Ca    9.3      28 Mar 2019 20:53    TPro  7.7  /  Alb  4.1  /  TBili  <0.2<L>  /  DBili  x   /  AST  25  /  ALT  37<H>  /  AlkPhos  104  03-28    Urinalysis Basic - ( 28 Mar 2019 20:55 )    Color: Yellow / Appearance: Clear / S.015 / pH: x  Gluc: x / Ketone: Negative  / Bili: Negative / Urobili: Negative mg/dL   Blood: x / Protein: 30 mg/dL / Nitrite: Negative   Leuk Esterase: Small / RBC: 3-5 /HPF / WBC 6-10   Sq Epi: x / Non Sq Epi: Moderate / Bacteria: Occasional            RADIOLOGY STUDIES: Name: JARAD KOHLER  MRN: [id number]  Allergies: Motrin (Angioedema)  penicillin (Angioedema)  sulfa drugs (Unknown)      JARAD KOHLER 28y G0 LMP 4 weeks ago currently spotting with known history of R ovarian cyst presents after gradual onset LLQ pain that began 4 weeks ago but became more severe over the past two days. Pt describes the pain as burning, no alleviating or aggravating factors. No radiation. Pt states that she rarely gets her period because she is on anti-psych medications for her Bipolar and Schizoaffective disorders.  Denies N/V, dysuria, changes in bowel movement, or vaginal discharge.     PAST GYN HISTORY: R ovarian cyst    PAST MEDICAL HISTORY:  Ovarian cyst  Asthma  Schizoaffective disorder  Bipolar disorder  Depression    PAST SURGICAL HISTORY: denies    Home Medications:  Haldol: milligram(s) injectable once a month (10 Feb 2017 01:51)    HOSPITAL MEDS:    Allergies    Motrin (Angioedema)  penicillin (Angioedema)  sulfa drugs (Unknown)    Intolerances    Vital Signs Last 24 Hrs  T(C): 36.7 (28 Mar 2019 23:59), Max: 36.7 (28 Mar 2019 23:59)  T(F): 98 (28 Mar 2019 23:59), Max: 98 (28 Mar 2019 23:59)  HR: 86 (28 Mar 2019 23:59) (86 - 95)  BP: 115/77 (28 Mar 2019 23:59) (115/77 - 135/74)  RR: 20 (28 Mar 2019 23:59) (20 - 20)  SpO2: 97% (28 Mar 2019 23:59) (97% - 97%)    PHYSICAL EXAM:  GEN: NAD, AOx3  Abd: Soft, Nontender, Nondistended. No rebound tenderness or guarding. Bowel sounds present  PELVIC:        EXTERNAL GENITALIA: atraumatic, no lesions        VAGINA: No discharge or active bleeding         CERVIX: Pink, closed        UTERUS: appropriate size        ADNEXA: not palpable    LABS:                        12.5   9.8   )-----------( 359      ( 28 Mar 2019 20:54 )             39.5         136  |  100  |  10.0  ----------------------------<  114  4.0   |  25.0  |  0.54    Ca    9.3      28 Mar 2019 20:53    TPro  7.7  /  Alb  4.1  /  TBili  <0.2<L>  /  DBili  x   /  AST  25  /  ALT  37<H>  /  AlkPhos  104      Urinalysis Basic - ( 28 Mar 2019 20:55 )    Color: Yellow / Appearance: Clear / S.015 / pH: x  Gluc: x / Ketone: Negative  / Bili: Negative / Urobili: Negative mg/dL   Blood: x / Protein: 30 mg/dL / Nitrite: Negative   Leuk Esterase: Small / RBC: 3-5 /HPF / WBC 6-10   Sq Epi: x / Non Sq Epi: Moderate / Bacteria: Occasional    RADIOLOGY STUDIES:  < from: CT Abdomen and Pelvis w/ IV Cont (19 @ 05:49) >  FINDINGS:    LOWER CHEST: Within normal limits.    HEPATOBILIARY: Fatty liver and hepatomegaly. No biliary ductal dilation.   Gallbladder within normal limits.  PANCREAS: Within normal limits.  SPLEEN: Within normal limits.  ADRENALS: Within normal limits.    KIDNEYS/URETERS/BLADDER: Within normal limits.  REPRODUCTIVE ORGANS: Right ovarian dermoid measuring 4.8 x 4.3 x 4.5 cm   (TR x AP x CC).    BOWEL/PERITONEUM: No bowel obstruction or pneumoperitoneum.  Normal   appendix. Portions of the gastrointestinal tract are collapsed, limiting   evaluation.     VESSELS:  Within normal limits.  LYMPHATICS/RETROPERITONEUM: No lymphadenopathy. No retroperitoneal   hematoma.      SOFT TISSUES: Within normal limits.  BONES: Within normal limits.    IMPRESSION: No acute CT findings. Fatty liver and hepatomegaly. Right   ovarian dermoid.    < end of copied text >  < from: US Transvaginal (19 @ 03:19) >  FINDINGS:  Uterus: Unremarkable measuring 8 x 2.9 x 4.3 cm. Endometrial stripe is   normal measuring 1 cm in maximum thickness.    Ovaries:   The right ovary measures 4.7 x 1.8 x 4.8 cm. Normal vascularity is   present. Incompletely characterized heterogeneous hyperechogenicity   measuring 4.2 x 1.8 x 3.6 cm at the level of the right ovary. Previous   ultrasound have demonstrated right ovarian echogenic structure measuring   up to 1.5 cm.     The left ovary measures 3.1 x 2.1 x 1.8 cm. Normal vascularity is   present. No adnexal masses.    There is mild pelvic free fluid.    IMPRESSION:    No sonographic findings of ovarian torsion.    Incompletely characterized heterogeneous hyperechogenicity measuring 4.2   x 1.8 x 3.6 cm at the level of the right ovary. Previous ultrasound have   demonstrated right ovarian echogenic structure measuring up to 1.5 cm.   The finding are nonspecific, this may represent interval enlargement of   the dermoid or neoplasm.  Leaking or ruptured dermoid considered in the   differential. GYN consultation and consider evaluation withMR for better   assessment.    < end of copied text >

## 2019-06-16 ENCOUNTER — TRANSCRIPTION ENCOUNTER (OUTPATIENT)
Age: 29
End: 2019-06-16

## 2019-10-10 PROBLEM — N83.209 UNSPECIFIED OVARIAN CYST, UNSPECIFIED SIDE: Chronic | Status: ACTIVE | Noted: 2019-03-28

## 2020-01-10 ENCOUNTER — APPOINTMENT (OUTPATIENT)
Dept: NEUROLOGY | Facility: CLINIC | Age: 30
End: 2020-01-10
Payer: MEDICAID

## 2020-01-10 VITALS
SYSTOLIC BLOOD PRESSURE: 130 MMHG | DIASTOLIC BLOOD PRESSURE: 76 MMHG | WEIGHT: 293 LBS | HEIGHT: 60 IN | BODY MASS INDEX: 57.52 KG/M2

## 2020-01-10 DIAGNOSIS — Z86.59 PERSONAL HISTORY OF OTHER MENTAL AND BEHAVIORAL DISORDERS: ICD-10-CM

## 2020-01-10 DIAGNOSIS — Z86.39 PERSONAL HISTORY OF OTHER ENDOCRINE, NUTRITIONAL AND METABOLIC DISEASE: ICD-10-CM

## 2020-01-10 PROCEDURE — 99204 OFFICE O/P NEW MOD 45 MIN: CPT

## 2020-01-10 RX ORDER — CITALOPRAM HYDROBROMIDE 40 MG/1
40 TABLET, FILM COATED ORAL
Refills: 0 | Status: ACTIVE | COMMUNITY

## 2020-01-10 RX ORDER — ARIPIPRAZOLE 5 MG/1
5 TABLET ORAL
Refills: 0 | Status: ACTIVE | COMMUNITY

## 2020-03-02 ENCOUNTER — APPOINTMENT (OUTPATIENT)
Dept: NEUROLOGY | Facility: CLINIC | Age: 30
End: 2020-03-02
Payer: MEDICAID

## 2020-03-02 DIAGNOSIS — G43.019 MIGRAINE W/OUT AURA, INTRACTABLE, W/OUT STATUS MIGRAINOSUS: ICD-10-CM

## 2020-03-02 DIAGNOSIS — F19.239 OTHER PSYCHOACTIVE SUBSTANCE DEPENDENCE WITH WITHDRAWAL, UNSPECIFIED: ICD-10-CM

## 2020-03-02 PROCEDURE — 99214 OFFICE O/P EST MOD 30 MIN: CPT

## 2020-03-02 RX ORDER — PROPRANOLOL HYDROCHLORIDE 40 MG/1
40 TABLET ORAL
Qty: 60 | Refills: 4 | Status: ACTIVE | COMMUNITY
Start: 2020-01-10 | End: 1900-01-01

## 2020-04-10 ENCOUNTER — APPOINTMENT (OUTPATIENT)
Dept: PULMONOLOGY | Facility: CLINIC | Age: 30
End: 2020-04-10

## 2020-07-17 ENCOUNTER — APPOINTMENT (OUTPATIENT)
Dept: NEUROLOGY | Facility: CLINIC | Age: 30
End: 2020-07-17

## 2020-07-27 ENCOUNTER — EMERGENCY (EMERGENCY)
Facility: HOSPITAL | Age: 30
LOS: 1 days | Discharge: HOME | End: 2020-07-27
Attending: STUDENT IN AN ORGANIZED HEALTH CARE EDUCATION/TRAINING PROGRAM
Payer: COMMERCIAL

## 2020-07-27 ENCOUNTER — TRANSCRIPTION ENCOUNTER (OUTPATIENT)
Age: 30
End: 2020-07-27

## 2020-07-27 VITALS
OXYGEN SATURATION: 98 % | TEMPERATURE: 98 F | DIASTOLIC BLOOD PRESSURE: 65 MMHG | RESPIRATION RATE: 19 BRPM | HEART RATE: 85 BPM | SYSTOLIC BLOOD PRESSURE: 125 MMHG

## 2020-07-27 VITALS
RESPIRATION RATE: 20 BRPM | TEMPERATURE: 99 F | OXYGEN SATURATION: 98 % | HEART RATE: 95 BPM | SYSTOLIC BLOOD PRESSURE: 135 MMHG | DIASTOLIC BLOOD PRESSURE: 89 MMHG

## 2020-07-27 LAB
ALBUMIN SERPL ELPH-MCNC: 4.1 G/DL — SIGNIFICANT CHANGE UP (ref 3.3–5.2)
ALP SERPL-CCNC: 117 U/L — SIGNIFICANT CHANGE UP (ref 40–120)
ALT FLD-CCNC: 27 U/L — SIGNIFICANT CHANGE UP
ANION GAP SERPL CALC-SCNC: 21 MMOL/L — HIGH (ref 5–17)
ANION GAP SERPL CALC-SCNC: 23 MMOL/L — HIGH (ref 5–17)
AST SERPL-CCNC: 20 U/L — SIGNIFICANT CHANGE UP
BASOPHILS # BLD AUTO: 0.06 K/UL — SIGNIFICANT CHANGE UP (ref 0–0.2)
BASOPHILS NFR BLD AUTO: 0.6 % — SIGNIFICANT CHANGE UP (ref 0–2)
BILIRUB SERPL-MCNC: 0.2 MG/DL — LOW (ref 0.4–2)
BUN SERPL-MCNC: 8 MG/DL — SIGNIFICANT CHANGE UP (ref 8–20)
BUN SERPL-MCNC: 9 MG/DL — SIGNIFICANT CHANGE UP (ref 8–20)
CALCIUM SERPL-MCNC: 8.7 MG/DL — SIGNIFICANT CHANGE UP (ref 8.6–10.2)
CALCIUM SERPL-MCNC: 9.7 MG/DL — SIGNIFICANT CHANGE UP (ref 8.6–10.2)
CHLORIDE SERPL-SCNC: 103 MMOL/L — SIGNIFICANT CHANGE UP (ref 98–107)
CHLORIDE SERPL-SCNC: 96 MMOL/L — LOW (ref 98–107)
CO2 SERPL-SCNC: 15 MMOL/L — LOW (ref 22–29)
CO2 SERPL-SCNC: 17 MMOL/L — LOW (ref 22–29)
CREAT SERPL-MCNC: 0.52 MG/DL — SIGNIFICANT CHANGE UP (ref 0.5–1.3)
CREAT SERPL-MCNC: 0.57 MG/DL — SIGNIFICANT CHANGE UP (ref 0.5–1.3)
EOSINOPHIL # BLD AUTO: 0.17 K/UL — SIGNIFICANT CHANGE UP (ref 0–0.5)
EOSINOPHIL NFR BLD AUTO: 1.7 % — SIGNIFICANT CHANGE UP (ref 0–6)
GLUCOSE SERPL-MCNC: 143 MG/DL — HIGH (ref 70–99)
GLUCOSE SERPL-MCNC: 172 MG/DL — HIGH (ref 70–99)
HCG SERPL-ACNC: <4 MIU/ML — SIGNIFICANT CHANGE UP
HCT VFR BLD CALC: 43.1 % — SIGNIFICANT CHANGE UP (ref 34.5–45)
HGB BLD-MCNC: 14.2 G/DL — SIGNIFICANT CHANGE UP (ref 11.5–15.5)
IMM GRANULOCYTES NFR BLD AUTO: 0.5 % — SIGNIFICANT CHANGE UP (ref 0–1.5)
LYMPHOCYTES # BLD AUTO: 2.71 K/UL — SIGNIFICANT CHANGE UP (ref 1–3.3)
LYMPHOCYTES # BLD AUTO: 27.2 % — SIGNIFICANT CHANGE UP (ref 13–44)
MCHC RBC-ENTMCNC: 28.4 PG — SIGNIFICANT CHANGE UP (ref 27–34)
MCHC RBC-ENTMCNC: 32.9 GM/DL — SIGNIFICANT CHANGE UP (ref 32–36)
MCV RBC AUTO: 86.2 FL — SIGNIFICANT CHANGE UP (ref 80–100)
MONOCYTES # BLD AUTO: 0.51 K/UL — SIGNIFICANT CHANGE UP (ref 0–0.9)
MONOCYTES NFR BLD AUTO: 5.1 % — SIGNIFICANT CHANGE UP (ref 2–14)
NEUTROPHILS # BLD AUTO: 6.48 K/UL — SIGNIFICANT CHANGE UP (ref 1.8–7.4)
NEUTROPHILS NFR BLD AUTO: 64.9 % — SIGNIFICANT CHANGE UP (ref 43–77)
PLATELET # BLD AUTO: 360 K/UL — SIGNIFICANT CHANGE UP (ref 150–400)
POTASSIUM SERPL-MCNC: 3.7 MMOL/L — SIGNIFICANT CHANGE UP (ref 3.5–5.3)
POTASSIUM SERPL-MCNC: 3.8 MMOL/L — SIGNIFICANT CHANGE UP (ref 3.5–5.3)
POTASSIUM SERPL-SCNC: 3.7 MMOL/L — SIGNIFICANT CHANGE UP (ref 3.5–5.3)
POTASSIUM SERPL-SCNC: 3.8 MMOL/L — SIGNIFICANT CHANGE UP (ref 3.5–5.3)
PROT SERPL-MCNC: 7.4 G/DL — SIGNIFICANT CHANGE UP (ref 6.6–8.7)
RBC # BLD: 5 M/UL — SIGNIFICANT CHANGE UP (ref 3.8–5.2)
RBC # FLD: 13.2 % — SIGNIFICANT CHANGE UP (ref 10.3–14.5)
SODIUM SERPL-SCNC: 136 MMOL/L — SIGNIFICANT CHANGE UP (ref 135–145)
SODIUM SERPL-SCNC: 139 MMOL/L — SIGNIFICANT CHANGE UP (ref 135–145)
WBC # BLD: 9.98 K/UL — SIGNIFICANT CHANGE UP (ref 3.8–10.5)
WBC # FLD AUTO: 9.98 K/UL — SIGNIFICANT CHANGE UP (ref 3.8–10.5)

## 2020-07-27 PROCEDURE — 80048 BASIC METABOLIC PNL TOTAL CA: CPT

## 2020-07-27 PROCEDURE — 82962 GLUCOSE BLOOD TEST: CPT

## 2020-07-27 PROCEDURE — 99285 EMERGENCY DEPT VISIT HI MDM: CPT

## 2020-07-27 PROCEDURE — 96375 TX/PRO/DX INJ NEW DRUG ADDON: CPT

## 2020-07-27 PROCEDURE — 70450 CT HEAD/BRAIN W/O DYE: CPT | Mod: 26

## 2020-07-27 PROCEDURE — 70450 CT HEAD/BRAIN W/O DYE: CPT

## 2020-07-27 PROCEDURE — 99284 EMERGENCY DEPT VISIT MOD MDM: CPT | Mod: 25

## 2020-07-27 PROCEDURE — 96374 THER/PROPH/DIAG INJ IV PUSH: CPT

## 2020-07-27 PROCEDURE — 84702 CHORIONIC GONADOTROPIN TEST: CPT

## 2020-07-27 PROCEDURE — 36415 COLL VENOUS BLD VENIPUNCTURE: CPT

## 2020-07-27 PROCEDURE — 80053 COMPREHEN METABOLIC PANEL: CPT

## 2020-07-27 PROCEDURE — 85027 COMPLETE CBC AUTOMATED: CPT

## 2020-07-27 RX ORDER — SODIUM CHLORIDE 9 MG/ML
1000 INJECTION INTRAMUSCULAR; INTRAVENOUS; SUBCUTANEOUS ONCE
Refills: 0 | Status: COMPLETED | OUTPATIENT
Start: 2020-07-27 | End: 2020-07-27

## 2020-07-27 RX ORDER — DIPHENHYDRAMINE HCL 50 MG
25 CAPSULE ORAL ONCE
Refills: 0 | Status: COMPLETED | OUTPATIENT
Start: 2020-07-27 | End: 2020-07-27

## 2020-07-27 RX ORDER — METOCLOPRAMIDE HCL 10 MG
10 TABLET ORAL ONCE
Refills: 0 | Status: COMPLETED | OUTPATIENT
Start: 2020-07-27 | End: 2020-07-27

## 2020-07-27 RX ADMIN — SODIUM CHLORIDE 1000 MILLILITER(S): 9 INJECTION INTRAMUSCULAR; INTRAVENOUS; SUBCUTANEOUS at 18:45

## 2020-07-27 RX ADMIN — Medication 25 MILLIGRAM(S): at 18:44

## 2020-07-27 RX ADMIN — Medication 10 MILLIGRAM(S): at 18:44

## 2020-07-27 RX ADMIN — SODIUM CHLORIDE 1000 MILLILITER(S): 9 INJECTION INTRAMUSCULAR; INTRAVENOUS; SUBCUTANEOUS at 19:27

## 2020-07-27 NOTE — ED PROVIDER NOTE - OBJECTIVE STATEMENT
28 y/o female with sig PMHx of NIDDM, asthma, depression, schizoaffective disorder, bipolar presents with c/o 10/10 frontal headache that began suddenly at 1400 today. She states she was eating a burrito for lunch when she developed epigastric pain and a headache at which point she checked her BP which was 152/99 and went to the urgent care where they noted her BS was high. She denies any aggravating or alleviating factors. She denies any abdomen pain at this time, sob, cp, fevers, recent illness, n/v/d, nuchal rigidity, photophobia, OCP use, AC use.

## 2020-07-27 NOTE — ED PROVIDER NOTE - PATIENT PORTAL LINK FT
You can access the FollowMyHealth Patient Portal offered by [facility] by registering at the following website: [email or web address]. By joining [facility]’s FollowMyHealth portal, you will also be able to view your health information using other applications (apps) compatible with our system.

## 2020-07-27 NOTE — ED PROVIDER NOTE - ATTENDING CONTRIBUTION TO CARE
HPI: 28yo female with PMH NIDDM, asthma, depression, schizoaffective disorder, presenting with frontal headache that started at 2pm this afternoon, sudden onset. A/w epigastric pain that started after eating burrito. Checked BP at home which was elevated. Went to  and found to have FS in 300s, dc'd home. Patient called her PMD who told her to come to ED for evaluation.     PE:  Gen: NAD  Head: NCAT  HEENT: Oral mucosa moist, normal conjunctiva  CV: RRR no murmurs, normal perfusion  Resp: CTA b/l, no wheezing, rales, rhonchi, no respiratory distress  GI: Abd Soft NTND  Neuro: No focal neuro deficits, no nuchal rigidity  MSK: FROM all 4 extremities, no deformity  Skin: No rash, no bruising  Psych: Normal affect    MDM: 28yo F with h/o DM presenting with headache, epigastric pain, hyperglycemia. Check CT head eval for subarachnoid hemorrhage, labs, give fluids, migraine cocktail and reassess. Georgette Whitney DO     I performed a history and physical exam of the patient and discussed their management with the advanced care provider. I reviewed the advanced care provider's note and agree with the documented findings and plan of care. My medical decision making and objective findings are found above.

## 2020-07-27 NOTE — ED PROVIDER NOTE - RESPIRATORY NEGATIVE STATEMENT, MLM
Scribe Attestation (For Scribes USE Only)... no chest pain, no cough, and no shortness of breath. Scribe Attestation (For Scribes USE Only).../Attending Attestation (For Attendings USE Only)...

## 2020-07-27 NOTE — ED PROVIDER NOTE - CLINICAL SUMMARY MEDICAL DECISION MAKING FREE TEXT BOX
28 y/o female with sig PMH obesity, DM presenting with acute onset frontal HA 10/10. No nuchal rigidity, photophobia, neuro deficits, cp, sob. Likely migrainous HA vs SAH vs intracranial pathology. Given tamra and severity of HA, will obtain CT head non contrast, reglan, benadryl, IVF and reassess.

## 2020-07-27 NOTE — ED ADULT NURSE NOTE - OBJECTIVE STATEMENT
per pt c/o headache and high sugar per pt she was sent from urgent care to come here after her headache and being told her sugar was high pt is alert oriented offers no other complaints at this time.

## 2020-07-27 NOTE — ED PROVIDER NOTE - NSFOLLOWUPINSTRUCTIONS_ED_ALL_ED_FT
1) Follow up with your doctor this week  2) Return to the ER for worsening or concerning symptoms    Acute Headache    WHAT YOU NEED TO KNOW:    An acute headache is pain or discomfort that starts suddenly and gets worse quickly. You may have an acute headache only when you feel stress or eat certain foods. Other acute headache pain can happen every day, and sometimes several times a day.     DISCHARGE INSTRUCTIONS:    Return to the emergency department if:     You have severe pain.      You have numbness or weakness on one side of your face or body.      You have a headache that occurs after a blow to the head, a fall, or other trauma.       You have a headache, are forgetful or confused, or have trouble speaking.      You have a headache, stiff neck, and a fever.    Contact your healthcare provider if:     You have a constant headache and are vomiting.      You have a headache each day that does not get better, even after treatment.      You have changes in your headaches, or new symptoms that occur when you have a headache.      You have questions or concerns about your condition or care.    Medicines: You may need any of the following:     Prescription pain medicine may be given. The medicine your healthcare provider recommends will depend on the kind of headaches you have. You will need to take prescription headache medicines as directed to prevent a problem called rebound headache. These headaches happen with regular use of pain relievers for headache disorders.      NSAIDs, such as ibuprofen, help decrease swelling, pain, and fever. This medicine is available with or without a doctor's order. NSAIDs can cause stomach bleeding or kidney problems in certain people. If you take blood thinner medicine, always ask your healthcare provider if NSAIDs are safe for you. Always read the medicine label and follow directions.      Acetaminophen decreases pain and fever. It is available without a doctor's order. Ask how much to take and how often to take it. Follow directions. Read the labels of all other medicines you are using to see if they also contain acetaminophen, or ask your doctor or pharmacist. Acetaminophen can cause liver damage if not taken correctly. Do not use more than 3 grams (3,000 milligrams) total of acetaminophen in one day.       Antidepressants may be given for some kinds of headaches.       Take your medicine as directed. Contact your healthcare provider if you think your medicine is not helping or if you have side effects. Tell him or her if you are allergic to any medicine. Keep a list of the medicines, vitamins, and herbs you take. Include the amounts, and when and why you take them. Bring the list or the pill bottles to follow-up visits. Carry your medicine list with you in case of an emergency.    Manage your symptoms:     Apply heat or ice on the headache area. Use a heat or ice pack. For an ice pack, you can also put crushed ice in a plastic bag. Cover the pack or bag with a towel before you apply it to your skin. Ice and heat both help decrease pain, and heat also helps decrease muscle spasms. Apply heat for 20 to 30 minutes every 2 hours. Apply ice for 15 to 20 minutes every hour. Apply heat or ice for as long and for as many days as directed. You may alternate heat and ice.      Relax your muscles. Lie down in a comfortable position and close your eyes. Relax your muscles slowly. Start at your toes and work your way up your body.      Keep a record of your headaches. Write down when your headaches start and stop. Include your symptoms and what you were doing when the headache began. Record what you ate or drank for 24 hours before the headache started. Describe the pain and where it hurts. Keep track of what you did to treat your headache and if it worked.     Prevent an acute headache:     Avoid anything that triggers an acute headache. Examples include exposure to chemicals, going to high altitude, or not getting enough sleep. Create a regular sleep routine. Go to sleep at the same time and wake up at the same time each day. Do not use electronic devices before bedtime. These may trigger a headache or prevent you from sleeping well.      Do not smoke. Nicotine and other chemicals in cigarettes and cigars can trigger an acute headache or make it worse. Ask your healthcare provider for information if you currently smoke and need help to quit. E-cigarettes or smokeless tobacco still contain nicotine. Talk to your healthcare provider before you use these products.       Limit alcohol as directed. Alcohol can trigger an acute headache or make it worse. If you have cluster headaches, do not drink alcohol during an episode. For other types of headaches, ask your healthcare provider if it is safe for you to drink alcohol. Ask how much is safe for you to drink, and how often.      Exercise as directed. Exercise can reduce tension and help with headache pain. Aim for 30 minutes of physical activity on most days of the week. Your healthcare provider can help you create an exercise plan.      Eat a variety of healthy foods. Healthy foods include fruits, vegetables, low-fat dairy products, lean meats, fish, whole grains, and cooked beans. Your healthcare provider or dietitian can help you create meals plans if you need to avoid foods that trigger headaches.    Follow up with your healthcare provider as directed: Bring your headache record with you when you see your healthcare provider. Write down your questions so you remember to ask them during your visits. 1) Follow up with your doctor this week  2) Return to the ER for worsening or concerning symptoms    Acute Headache    WHAT YOU NEED TO KNOW:    An acute headache is pain or discomfort that starts suddenly and gets worse quickly. You may have an acute headache only when you feel stress or eat certain foods. Other acute headache pain can happen every day, and sometimes several times a day.     DISCHARGE INSTRUCTIONS:    Return to the emergency department if:     You have severe pain.      You have numbness or weakness on one side of your face or body.      You have a headache that occurs after a blow to the head, a fall, or other trauma.       You have a headache, are forgetful or confused, or have trouble speaking.      You have a headache, stiff neck, and a fever.    Contact your healthcare provider if:     You have a constant headache and are vomiting.      You have a headache each day that does not get better, even after treatment.      You have changes in your headaches, or new symptoms that occur when you have a headache.      You have questions or concerns about your condition or care.    Medicines: You may need any of the following:     Prescription pain medicine may be given. The medicine your healthcare provider recommends will depend on the kind of headaches you have. You will need to take prescription headache medicines as directed to prevent a problem called rebound headache. These headaches happen with regular use of pain relievers for headache disorders.      NSAIDs, such as ibuprofen, help decrease swelling, pain, and fever. This medicine is available with or without a doctor's order. NSAIDs can cause stomach bleeding or kidney problems in certain people. If you take blood thinner medicine, always ask your healthcare provider if NSAIDs are safe for you. Always read the medicine label and follow directions.      Acetaminophen decreases pain and fever. It is available without a doctor's order. Ask how much to take and how often to take it. Follow directions. Read the labels of all other medicines you are using to see if they also contain acetaminophen, or ask your doctor or pharmacist. Acetaminophen can cause liver damage if not taken correctly. Do not use more than 3 grams (3,000 milligrams) total of acetaminophen in one day.       Antidepressants may be given for some kinds of headaches.       Take your medicine as directed. Contact your healthcare provider if you think your medicine is not helping or if you have side effects. Tell him or her if you are allergic to any medicine. Keep a list of the medicines, vitamins, and herbs you take. Include the amounts, and when and why you take them. Bring the list or the pill bottles to follow-up visits. Carry your medicine list with you in case of an emergency.    Manage your symptoms:     Apply heat or ice on the headache area. Use a heat or ice pack. For an ice pack, you can also put crushed ice in a plastic bag. Cover the pack or bag with a towel before you apply it to your skin. Ice and heat both help decrease pain, and heat also helps decrease muscle spasms. Apply heat for 20 to 30 minutes every 2 hours. Apply ice for 15 to 20 minutes every hour. Apply heat or ice for as long and for as many days as directed. You may alternate heat and ice.      Relax your muscles. Lie down in a comfortable position and close your eyes. Relax your muscles slowly. Start at your toes and work your way up your body.      Keep a record of your headaches. Write down when your headaches start and stop. Include your symptoms and what you were doing when the headache began. Record what you ate or drank for 24 hours before the headache started. Describe the pain and where it hurts. Keep track of what you did to treat your headache and if it worked.     Prevent an acute headache:     Avoid anything that triggers an acute headache. Examples include exposure to chemicals, going to high altitude, or not getting enough sleep. Create a regular sleep routine. Go to sleep at the same time and wake up at the same time each day. Do not use electronic devices before bedtime. These may trigger a headache or prevent you from sleeping well.      Do not smoke. Nicotine and other chemicals in cigarettes and cigars can trigger an acute headache or make it worse. Ask your healthcare provider for information if you currently smoke and need help to quit. E-cigarettes or smokeless tobacco still contain nicotine. Talk to your healthcare provider before you use these products.       Limit alcohol as directed. Alcohol can trigger an acute headache or make it worse. If you have cluster headaches, do not drink alcohol during an episode. For other types of headaches, ask your healthcare provider if it is safe for you to drink alcohol. Ask how much is safe for you to drink, and how often.      Exercise as directed. Exercise can reduce tension and help with headache pain. Aim for 30 minutes of physical activity on most days of the week. Your healthcare provider can help you create an exercise plan.      Eat a variety of healthy foods. Healthy foods include fruits, vegetables, low-fat dairy products, lean meats, fish, whole grains, and cooked beans. Your healthcare provider or dietitian can help you create meals plans if you need to avoid foods that trigger headaches.    Follow up with your healthcare provider as directed: Bring your headache record with you when you see your healthcare provider. Write down your questions so you remember to ask them during your visits.    Heart Palpitations    WHAT YOU NEED TO KNOW:    Heart palpitations are feelings that your heart races, jumps, throbs, or flutters. You may feel extra beats, no beats for a short time, or skipped beats. You may have these feelings in your chest, throat, or neck. They may happen when you are sitting, standing, or lying. Heart palpitations may be frightening, but are usually not caused by a serious problem.     DISCHARGE INSTRUCTIONS:    Call 911 or have someone else call for any of the following:     You have any of the following signs of a heart attack:   Squeezing, pressure, or pain in your chest      You may also have any of the following:   Discomfort or pain in your back, neck, jaw, stomach, or arm      Shortness of breath      Nausea or vomiting      Lightheadedness or a sudden cold sweat      You have any of the following signs of a stroke:   Numbness or drooping on one side of your face       Weakness in an arm or leg      Confusion or difficulty speaking      Dizziness, a severe headache, or vision loss      You faint or lose consciousness.     Return to the emergency department if:     Your palpitations happen more often or get more intense.         Contact your healthcare provider if:     You have new or worsening swelling in your feet or ankles.      You have questions or concerns about your condition or care.    Follow up with your healthcare provider as directed: You may need to follow up with a cardiologist. You may need tests to check for heart problems that cause palpitations. Write down your questions so you remember to ask them during your visits.     Keep a record: Write down when your palpitations start and stop, what you were doing when they started, and your symptoms. Keep track of what you ate or drank within a few hours of your palpitations. Include anything that seemed to help your symptoms, such as lying down or holding your breath. This record will help you and your healthcare provider learn what triggers your palpitations. Bring this record with you to your follow up visits.    Help prevent heart palpitations:     Manage stress and anxiety. Find ways to relax such as listening to music, meditating, or doing yoga. Exercise can also help decrease stress and anxiety. Talk to someone you trust about your stress or anxiety. You can also talk to a therapist.       Get plenty of sleep every night. Ask your healthcare provider how much sleep you need each night.       Do not drink caffeine or alcohol. Caffeine and alcohol can make your palpitations worse. Caffeine is found in soda, coffee, tea, chocolate, and drinks that increase your energy.       Do not smoke. Nicotine and other chemicals in cigarettes and cigars may damage your heart and blood vessels. Ask your healthcare provider for information if you currently smoke and need help to quit. E-cigarettes or smokeless tobacco still contain nicotine. Talk to your healthcare provider before you use these products.       Do not use illegal drugs. Talk to your healthcare provider if you use illegal drugs and want help to quit.

## 2020-07-27 NOTE — ED PROVIDER NOTE - PROGRESS NOTE DETAILS
NP Arena: CT head unremarkable, will order additional liter IVF and repeat BMP and reassess. As per signout from Dr. Whitney, patient pending IVF and repeat BMP/ Patient feeling much better.

## 2020-07-27 NOTE — ED PROVIDER NOTE - CARE PLAN
Principal Discharge DX:	Acute nonintractable headache, unspecified headache type  Secondary Diagnosis:	Gastroesophageal reflux disease without esophagitis

## 2020-07-27 NOTE — ED ADULT TRIAGE NOTE - CHIEF COMPLAINT QUOTE
Pt presents to ED with multiple complaints, initially chief complaint was uncontrolled hyperglycemia and symptomatic hypertension. Pt also c/o headache, N/V, abdominal pain.

## 2020-09-13 ENCOUNTER — RX RENEWAL (OUTPATIENT)
Age: 30
End: 2020-09-13

## 2020-10-05 ENCOUNTER — TRANSCRIPTION ENCOUNTER (OUTPATIENT)
Age: 30
End: 2020-10-05

## 2020-10-06 PROBLEM — E11.9 TYPE 2 DIABETES MELLITUS WITHOUT COMPLICATIONS: Chronic | Status: ACTIVE | Noted: 2020-07-27

## 2020-10-08 ENCOUNTER — APPOINTMENT (OUTPATIENT)
Dept: PHYSICAL MEDICINE AND REHAB | Facility: CLINIC | Age: 30
End: 2020-10-08
Payer: MEDICAID

## 2020-10-08 DIAGNOSIS — S39.012A STRAIN OF MUSCLE, FASCIA AND TENDON OF LOWER BACK, INITIAL ENCOUNTER: ICD-10-CM

## 2020-10-08 DIAGNOSIS — M62.830 MUSCLE SPASM OF BACK: ICD-10-CM

## 2020-10-08 PROCEDURE — 99203 OFFICE O/P NEW LOW 30 MIN: CPT

## 2020-10-08 RX ORDER — MELOXICAM 7.5 MG/1
7.5 TABLET ORAL TWICE DAILY
Qty: 60 | Refills: 0 | Status: ACTIVE | COMMUNITY
Start: 2020-10-08 | End: 1900-01-01

## 2020-10-08 NOTE — ASSESSMENT
[FreeTextEntry1] : After review of the history, physical examination, and imaging, the patient's symptoms are consistent with acute lumbar myofascial strain. The diagnosis were discussed in detail with the patient. We discussed all the potential treatment options including physical therapy, oral medication, interventional spine procedures, and surgery; We also discussed the importance of maintaining a healthy weight, ergonomics, and posture in the long term management of the condition. At this time, will recommend the following:\par -Mobic 7.5mg BID #60, to take with food. 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 All of the patient's questions and concerns were addressed during today's visit.

## 2020-10-08 NOTE — PHYSICAL EXAM
[FreeTextEntry1] : PE:Entire PE done with Athletic Chloe Santa in room\par \par Constitutional: In NAD, calm and cooperative\par HEENT: NCAT, Anicteric sclera, no lid-lag\par Cardio: Extremities appear pink and well perfused, no peripheral edema\par Respiratory: Normal respiratory effort on room air, no accessory muscle use\par Skin: no rashes seen on exposed skin, no visible abrasions\par Psych: Normal affect, intact judgment and insight\par Neuro: Awake, alert and oriented x 3, see below for focused neurological exam\par MSK (Low back):\par 	Inspection: no gross swelling identified\par 	Palpation: Tenderness of the bilateral lower lumbar paraspinals\par 	ROM: Pain at with 10 degrees lumbar flexion, no pain with lumbar flexion\par 	Strength: 4+/5 strength in bilateral proximal lower extremities due to pain in low back, 5/5 strength in distal LE\par 	Reflexes: 1+ Patella reflex bilaterally, 1+ Achilles reflex bilaterally, negative clonus bilaterally\par 	Sensation: Intact to light touch in bilateral lower extremities\par 	Special tests: seated SLR negative bilaterally, JOSEPH negative bilaterally, FADIR negative bilaterally\par

## 2020-10-08 NOTE — HISTORY OF PRESENT ILLNESS
[FreeTextEntry1] : Ms. JARAD KOHLER is a 30 year old  female who presents with low back pain. Pain started a few weeks ago, no specific incident. Pain is worse with laying flat or prolonged sitting/standing. 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 \par \par Location:lower back in the middle the worst, but also in band like fashion\par Onset:3 weeks ago, no specific event. \par Provocation/Palliative: Better when leaning over on one side, worse when lying flat\par Quality:"Hot"\par Radiation:None\par Severity: 8-9/10\par Timing:Worsening since Saturday when she lifted heavy items at work. \par \par Denies any associated numbness. Denies any associated leg weakness. Denies any loss of bowel/bladder control or any groin numbness.\par Previous medications trialed:Naproxen with some relief\par Previous procedures relevant to complaint:none\par Conservative therapy tried?:NSAIDs\par

## 2021-03-11 NOTE — ED PROVIDER NOTE - PRINCIPAL DIAGNOSIS
Bedside shift change report given to Valdo Bunn RN (oncoming nurse) by Sacha Bryant RN (offgoing nurse). Report included the following information SBAR, Intake/Output, MAR, Recent Results, Med Rec Status and Cardiac Rhythm NSR. Acute nonintractable headache, unspecified headache type

## 2021-09-24 ENCOUNTER — APPOINTMENT (OUTPATIENT)
Dept: RADIOLOGY | Facility: CLINIC | Age: 31
End: 2021-09-24
Payer: MEDICAID

## 2021-09-24 ENCOUNTER — OUTPATIENT (OUTPATIENT)
Dept: OUTPATIENT SERVICES | Facility: HOSPITAL | Age: 31
LOS: 1 days | End: 2021-09-24
Payer: MEDICAID

## 2021-09-24 DIAGNOSIS — Z00.8 ENCOUNTER FOR OTHER GENERAL EXAMINATION: ICD-10-CM

## 2021-09-24 PROCEDURE — 71046 X-RAY EXAM CHEST 2 VIEWS: CPT | Mod: 26

## 2021-09-24 PROCEDURE — 71046 X-RAY EXAM CHEST 2 VIEWS: CPT

## 2021-10-28 ENCOUNTER — TRANSCRIPTION ENCOUNTER (OUTPATIENT)
Age: 31
End: 2021-10-28

## 2022-10-07 ENCOUNTER — OUTPATIENT (OUTPATIENT)
Dept: OUTPATIENT SERVICES | Facility: HOSPITAL | Age: 32
LOS: 1 days | End: 2022-10-07
Payer: COMMERCIAL

## 2022-10-07 VITALS
SYSTOLIC BLOOD PRESSURE: 130 MMHG | DIASTOLIC BLOOD PRESSURE: 82 MMHG | WEIGHT: 293 LBS | HEIGHT: 65 IN | TEMPERATURE: 97 F | HEART RATE: 99 BPM | RESPIRATION RATE: 18 BRPM | OXYGEN SATURATION: 98 %

## 2022-10-07 DIAGNOSIS — Z29.9 ENCOUNTER FOR PROPHYLACTIC MEASURES, UNSPECIFIED: ICD-10-CM

## 2022-10-07 DIAGNOSIS — Z91.89 OTHER SPECIFIED PERSONAL RISK FACTORS, NOT ELSEWHERE CLASSIFIED: ICD-10-CM

## 2022-10-07 DIAGNOSIS — E66.01 MORBID (SEVERE) OBESITY DUE TO EXCESS CALORIES: ICD-10-CM

## 2022-10-07 DIAGNOSIS — I10 ESSENTIAL (PRIMARY) HYPERTENSION: ICD-10-CM

## 2022-10-07 DIAGNOSIS — N94.6 DYSMENORRHEA, UNSPECIFIED: ICD-10-CM

## 2022-10-07 DIAGNOSIS — Z01.818 ENCOUNTER FOR OTHER PREPROCEDURAL EXAMINATION: ICD-10-CM

## 2022-10-07 DIAGNOSIS — F31.9 BIPOLAR DISORDER, UNSPECIFIED: ICD-10-CM

## 2022-10-07 DIAGNOSIS — E11.9 TYPE 2 DIABETES MELLITUS WITHOUT COMPLICATIONS: ICD-10-CM

## 2022-10-07 DIAGNOSIS — F25.9 SCHIZOAFFECTIVE DISORDER, UNSPECIFIED: ICD-10-CM

## 2022-10-07 DIAGNOSIS — G47.33 OBSTRUCTIVE SLEEP APNEA (ADULT) (PEDIATRIC): ICD-10-CM

## 2022-10-07 DIAGNOSIS — Z78.9 OTHER SPECIFIED HEALTH STATUS: Chronic | ICD-10-CM

## 2022-10-07 DIAGNOSIS — N80.00 ENDOMETRIOSIS OF THE UTERUS, UNSPECIFIED: ICD-10-CM

## 2022-10-07 DIAGNOSIS — D27.0 BENIGN NEOPLASM OF RIGHT OVARY: ICD-10-CM

## 2022-10-07 DIAGNOSIS — J45.909 UNSPECIFIED ASTHMA, UNCOMPLICATED: ICD-10-CM

## 2022-10-07 LAB
A1C WITH ESTIMATED AVERAGE GLUCOSE RESULT: 5.9 % — HIGH (ref 4–5.6)
ALBUMIN SERPL ELPH-MCNC: 4 G/DL — SIGNIFICANT CHANGE UP (ref 3.3–5.2)
ALP SERPL-CCNC: 103 U/L — SIGNIFICANT CHANGE UP (ref 40–120)
ALT FLD-CCNC: 24 U/L — SIGNIFICANT CHANGE UP
ANION GAP SERPL CALC-SCNC: 12 MMOL/L — SIGNIFICANT CHANGE UP (ref 5–17)
APPEARANCE UR: CLEAR — SIGNIFICANT CHANGE UP
APTT BLD: 34.2 SEC — SIGNIFICANT CHANGE UP (ref 27.5–35.5)
AST SERPL-CCNC: 16 U/L — SIGNIFICANT CHANGE UP
BACTERIA # UR AUTO: ABNORMAL
BASOPHILS # BLD AUTO: 0.07 K/UL — SIGNIFICANT CHANGE UP (ref 0–0.2)
BASOPHILS NFR BLD AUTO: 0.8 % — SIGNIFICANT CHANGE UP (ref 0–2)
BILIRUB SERPL-MCNC: 0.3 MG/DL — LOW (ref 0.4–2)
BILIRUB UR-MCNC: NEGATIVE — SIGNIFICANT CHANGE UP
BLD GP AB SCN SERPL QL: SIGNIFICANT CHANGE UP
BUN SERPL-MCNC: 10.3 MG/DL — SIGNIFICANT CHANGE UP (ref 8–20)
CALCIUM SERPL-MCNC: 9.4 MG/DL — SIGNIFICANT CHANGE UP (ref 8.4–10.5)
CHLORIDE SERPL-SCNC: 100 MMOL/L — SIGNIFICANT CHANGE UP (ref 98–107)
CO2 SERPL-SCNC: 24 MMOL/L — SIGNIFICANT CHANGE UP (ref 22–29)
COLOR SPEC: YELLOW — SIGNIFICANT CHANGE UP
CREAT SERPL-MCNC: 0.68 MG/DL — SIGNIFICANT CHANGE UP (ref 0.5–1.3)
DIFF PNL FLD: ABNORMAL
EGFR: 119 ML/MIN/1.73M2 — SIGNIFICANT CHANGE UP
EOSINOPHIL # BLD AUTO: 0.13 K/UL — SIGNIFICANT CHANGE UP (ref 0–0.5)
EOSINOPHIL NFR BLD AUTO: 1.4 % — SIGNIFICANT CHANGE UP (ref 0–6)
EPI CELLS # UR: ABNORMAL
ESTIMATED AVERAGE GLUCOSE: 123 MG/DL — HIGH (ref 68–114)
GLUCOSE SERPL-MCNC: 105 MG/DL — HIGH (ref 70–99)
GLUCOSE UR QL: NEGATIVE MG/DL — SIGNIFICANT CHANGE UP
HCG SERPL-ACNC: <4 MIU/ML — SIGNIFICANT CHANGE UP
HCT VFR BLD CALC: 42.5 % — SIGNIFICANT CHANGE UP (ref 34.5–45)
HGB BLD-MCNC: 14 G/DL — SIGNIFICANT CHANGE UP (ref 11.5–15.5)
IMM GRANULOCYTES NFR BLD AUTO: 0.3 % — SIGNIFICANT CHANGE UP (ref 0–0.9)
INR BLD: 1.13 RATIO — SIGNIFICANT CHANGE UP (ref 0.88–1.16)
KETONES UR-MCNC: NEGATIVE — SIGNIFICANT CHANGE UP
LEUKOCYTE ESTERASE UR-ACNC: ABNORMAL
LYMPHOCYTES # BLD AUTO: 2.27 K/UL — SIGNIFICANT CHANGE UP (ref 1–3.3)
LYMPHOCYTES # BLD AUTO: 25.2 % — SIGNIFICANT CHANGE UP (ref 13–44)
MCHC RBC-ENTMCNC: 27.9 PG — SIGNIFICANT CHANGE UP (ref 27–34)
MCHC RBC-ENTMCNC: 32.9 GM/DL — SIGNIFICANT CHANGE UP (ref 32–36)
MCV RBC AUTO: 84.8 FL — SIGNIFICANT CHANGE UP (ref 80–100)
MONOCYTES # BLD AUTO: 0.57 K/UL — SIGNIFICANT CHANGE UP (ref 0–0.9)
MONOCYTES NFR BLD AUTO: 6.3 % — SIGNIFICANT CHANGE UP (ref 2–14)
NEUTROPHILS # BLD AUTO: 5.94 K/UL — SIGNIFICANT CHANGE UP (ref 1.8–7.4)
NEUTROPHILS NFR BLD AUTO: 66 % — SIGNIFICANT CHANGE UP (ref 43–77)
NITRITE UR-MCNC: NEGATIVE — SIGNIFICANT CHANGE UP
PH UR: 5 — SIGNIFICANT CHANGE UP (ref 5–8)
PLATELET # BLD AUTO: 492 K/UL — HIGH (ref 150–400)
POTASSIUM SERPL-MCNC: 4 MMOL/L — SIGNIFICANT CHANGE UP (ref 3.5–5.3)
POTASSIUM SERPL-SCNC: 4 MMOL/L — SIGNIFICANT CHANGE UP (ref 3.5–5.3)
PROT SERPL-MCNC: 8 G/DL — SIGNIFICANT CHANGE UP (ref 6.6–8.7)
PROT UR-MCNC: NEGATIVE — SIGNIFICANT CHANGE UP
PROTHROM AB SERPL-ACNC: 13.1 SEC — SIGNIFICANT CHANGE UP (ref 10.5–13.4)
RBC # BLD: 5.01 M/UL — SIGNIFICANT CHANGE UP (ref 3.8–5.2)
RBC # FLD: 13.2 % — SIGNIFICANT CHANGE UP (ref 10.3–14.5)
RBC CASTS # UR COMP ASSIST: SIGNIFICANT CHANGE UP /HPF (ref 0–4)
SODIUM SERPL-SCNC: 136 MMOL/L — SIGNIFICANT CHANGE UP (ref 135–145)
SP GR SPEC: 1.02 — SIGNIFICANT CHANGE UP (ref 1.01–1.02)
UROBILINOGEN FLD QL: NEGATIVE MG/DL — SIGNIFICANT CHANGE UP
WBC # BLD: 9.01 K/UL — SIGNIFICANT CHANGE UP (ref 3.8–10.5)
WBC # FLD AUTO: 9.01 K/UL — SIGNIFICANT CHANGE UP (ref 3.8–10.5)
WBC UR QL: SIGNIFICANT CHANGE UP /HPF (ref 0–5)

## 2022-10-07 PROCEDURE — 71046 X-RAY EXAM CHEST 2 VIEWS: CPT | Mod: 26

## 2022-10-07 PROCEDURE — G0463: CPT

## 2022-10-07 PROCEDURE — 71046 X-RAY EXAM CHEST 2 VIEWS: CPT

## 2022-10-07 PROCEDURE — 93010 ELECTROCARDIOGRAM REPORT: CPT

## 2022-10-07 PROCEDURE — 93005 ELECTROCARDIOGRAM TRACING: CPT

## 2022-10-07 RX ORDER — ARIPIPRAZOLE 15 MG/1
1 TABLET ORAL
Qty: 0 | Refills: 0 | DISCHARGE

## 2022-10-07 RX ORDER — HALOPERIDOL DECANOATE 100 MG/ML
0 INJECTION INTRAMUSCULAR
Qty: 0 | Refills: 0 | DISCHARGE

## 2022-10-07 RX ORDER — TRAZODONE HCL 50 MG
1 TABLET ORAL
Qty: 0 | Refills: 0 | DISCHARGE

## 2022-10-07 RX ORDER — SEMAGLUTIDE 0.68 MG/ML
1 INJECTION, SOLUTION SUBCUTANEOUS
Qty: 0 | Refills: 0 | DISCHARGE

## 2022-10-07 RX ORDER — DOCUSATE SODIUM 100 MG
1 CAPSULE ORAL
Qty: 0 | Refills: 0 | DISCHARGE

## 2022-10-07 RX ORDER — FAMOTIDINE 10 MG/ML
1 INJECTION INTRAVENOUS
Qty: 0 | Refills: 0 | DISCHARGE

## 2022-10-07 RX ORDER — ALBUTEROL 90 UG/1
3 AEROSOL, METERED ORAL
Qty: 0 | Refills: 0 | DISCHARGE

## 2022-10-07 NOTE — H&P PST ADULT - PSYCHIATRIC COMMENTS
States she has had thoughts of suicide in the past, states she speaks to her therapist each week. States she had thoughts of suicide in the last week, states she has ideas of committing suicide, states she has thought of taking multiple trazadone and putting them in a , denies being suicidal on exam today.  States she has spoken to her psychologist in the past. See HPI- States she speaks to her therapist each week and follows with psychiatry. States she had thoughts of suicide in the last week, states she has had ideas of committing suicide, states she has thought of taking multiple Trazadone and putting them in a  and taking them, denies being suicidal on exam today, denies active plans for suicide as described on exam today.  States she has spoken to her psychologist/psychiatrist about this in the past.

## 2022-10-07 NOTE — H&P PST ADULT - LAB RESULTS AND INTERPRETATION
Labs pending Labs pending  Addendum: 10/7/22 12:51 All available labs noted as documented, all abnormal labs noted as documented and have been faxed to PCP with whom medical clearance is pending. Urine culture pending. Surgeon emailed re. plt [phone] as documented. Kaci MS, FNP-BC Labs pending  Addendum: 10/7/22 12:51 All available labs noted as documented, all abnormal labs noted as documented and have been faxed to PCP with whom medical clearance is pending. Urine culture pending. Surgeon emailed re. plt 492 as documented. Kaci OSWALD, [facility]-BC    Urine culture results reviewed, Sent to Dr. Palomino for review. AS FNP-BC

## 2022-10-07 NOTE — H&P PST ADULT - HISTORY OF PRESENT ILLNESS
33 y/o female presents today to laparoscopic right ovarian cystectomy possible oophorectomy and intrauterine device insertion secondary to benign neoplasm of right ovary, dysmenorrhea, endometriosis of uterus with Dr. Neeru Palomino 10/27/22. Pt. with history of morbid obesity, bipolar disorder and schizoaffective disorder, asthma - denies wheezing or cough, recent use of prn inhaler, does admit to SOB with minimal to moderate physical exertion, type 2 DM, HTN- not presently on medications she states provider took her off as she is having GI upset, NICOLAS not on CPAP, has a mouth guard but states she does not wear, constipation/diarrhea, GERD. Pt. states she was told that she has a cyst, states she has had a cyst in the past that took her in to the hospital and was told the cyst exploded and she was bleeding as a result. Pt. was told she again has a cyst that has hair and cyst but due to COVID the care and follow up was delayed, states she saw Dr. Palomino who told her the cyst was large in size and that it needs to be removed from ovary. Pt. right sided ovarian pain, states pain is intermittent, states 8/10 at times, described as coming with her period and being stronger with her period, pain lessens when period ends, denies previous treatments, states she gets pain on left side also at times. States sometimes she takes tylenol and aleve which helps the pain for short periods of time. Pt.  States she has had thoughts of suicide in the past, states she speaks to her therapist each week. States she had thoughts of suicide in the last week, states she has ideas of committing suicide, states she has thought of taking multiple trazadone and putting them in a , denies being suicidal on exam today.  States she has spoken to her psychologist in the past.  31 y/o Syriac/English speaking female presents today to PST pending laparoscopic right ovarian cystectomy possible oophorectomy and intrauterine device insertion secondary to benign neoplasm of right ovary, dysmenorrhea, endometriosis of uterus with Dr. Neeru Palomino 10/27/22. Pt. with history of morbid obesity, bipolar disorder and schizoaffective disorder, asthma - denies wheezing or cough, recent use of prn inhaler, does admit to SOB with minimal to moderate physical exertion, type 2 DM, HTN- not presently on medications she states provider took her off as she is having GI upset, NICOLAS not on CPAP, has a mouth guard but states she does not wear, constipation/diarrhea, GERD. Pt. states she was told that she has a cyst, states she has had a cyst in the past that took her in to the hospital and was told the cyst exploded and she was bleeding as a result. Pt. was told she again has a cyst that has hair and cyst but due to COVID the care and follow up was delayed, states she saw Dr. Palomino who told her the cyst was large in size and that it needs to be removed from ovary. Pt. right sided ovarian pain, states pain is intermittent, states 8/10 at times, described as coming with her period and being stronger with her period, pain lessens when period ends, denies previous treatments, states she gets pain on left side also at times. States sometimes she takes tylenol and aleve which helps the pain for short periods of time. States she speaks to her therapist each week and follows with psychiatry. States she had thoughts of suicide in the last week, states she has had ideas of committing suicide, states she has thought of taking multiple Trazadone and putting them in a  and taking them, denies being suicidal on exam today, denies active plans for suicide as described on exam today.  States she has spoken to her psychologist/psychiatrist about this in the past.

## 2022-10-07 NOTE — H&P PST ADULT - PROBLEM SELECTOR PLAN 11
Medical clearance pending. ORT = 6. Surgical team to follow. PCP for medical management and follow up as indicated.

## 2022-10-07 NOTE — H&P PST ADULT - GASTROINTESTINAL
details… normal/soft/nondistended/normal active bowel sounds/no guarding/no rigidity/no organomegaly/no palpable terry/no masses palpable/tender

## 2022-10-07 NOTE — H&P PST ADULT - PROBLEM SELECTOR PLAN 8
Medical and cardiac clearance pending for  laparoscopic right ovarian cystectomy possible oophorectomy and intrauterine device insertion secondary to benign neoplasm of right ovary, dysmenorrhea, endometriosis of uterus with Dr. Neeru Palomino 10/27/22.

## 2022-10-07 NOTE — H&P PST ADULT - EKG AND INTERPRETATION
EKG Sinus tachycardia 103 BPM pending medical and cardiac clearance and final cardiac interpretation, EKG has been faxed to PCP and cardiology

## 2022-10-07 NOTE — H&P PST ADULT - ALLERGY TYPES
allergic to egg/outdoor environmental allergies/indoor environmental allergies/reactions to medicines/reactions to food

## 2022-10-07 NOTE — H&P PST ADULT - NEGATIVE PSYCHIATRIC SYMPTOMS
no suicidal ideation/no insomnia/no memory loss/no paranoia/no mood swings/no agitation/no visual hallucinations/no auditory hallucinations/no hyperactivity

## 2022-10-07 NOTE — H&P PST ADULT - ASSESSMENT
33 y/o Lithuanian/English speaking female presents today to PST pending laparoscopic right ovarian cystectomy possible oophorectomy and intrauterine device insertion secondary to benign neoplasm of right ovary, dysmenorrhea, endometriosis of uterus with Dr. Neeru Palomino 10/27/22. Pt. with history of morbid obesity, bipolar disorder and schizoaffective disorder, asthma - denies wheezing or cough, recent use of prn inhaler, does admit to SOB with minimal to moderate physical exertion, type 2 DM, HTN- not presently on medications she states provider took her off as she is having GI upset, NICOLAS not on CPAP, has a mouth guard but states she does not wear, constipation/diarrhea, GERD. Pt. states she was told that she has a cyst, states she has had a cyst in the past that took her in to the hospital and was told the cyst exploded and she was bleeding as a result. Pt. was told she again has a cyst that has hair and cyst but due to COVID the care and follow up was delayed, states she saw Dr. Palomino who told her the cyst was large in size and that it needs to be removed from ovary. Pt. right sided ovarian pain, states pain is intermittent, states 8/10 at times, described as coming with her period and being stronger with her period, pain lessens when period ends, denies previous treatments, states she gets pain on left side also at times. States sometimes she takes tylenol and aleve which helps the pain for short periods of time.  BMI 50.0. Admits to SOB with mild physical exertion like walking in to building today, admits to SOB with moderate physical exertion like climbing one flight of stairs, admits to feeling of tight chest due to SOB when climbing stairs. Denies CP with mild physical exertion.  Sinus tachycardia on .      States she speaks to her therapist each week and follows with psychiatry. States she had thoughts of suicide in the last week, states she has had ideas of committing suicide, states she has thought of taking multiple Trazadone and putting them in a  and taking them, denies being suicidal on exam today, denies active plans for suicide as described on exam today.  States she has spoken to her psychologist/psychiatrist about this in the past.   This was communicated to PCP office spoke to Dr. Rice via phone at 08:45AM and advised that pt. should be brought in to office for immediate evaluation, provider states she will follow up with the pt. Pt. provided Suicide hotline contact number [phone] and advised to utilize this hotline in the event active suicidal ideation recurs, and/or contact 911 for immediate evaluation, pt. verbalized agreement and understanding. SW consult ordered for DOS, email sent to SW Leena Corona, Corinne Aylward and Surgeon made aware. Message left for Psychiatry Betsy Vazquez and Alta Huerta with PST phone number requesting call back, office closed at time of call this AM.  09:16 Spoke to Psychiatry Betsy Johnson regarding the above who states she will contact the patient and provide the patient with DASH support number and have her checked in on frequently and closely. Kaci MS, FNP-BC     Pt. to have medical clearance with Dr. Leah Borrego, pt. is aware to make appt. for medical optimization, pt. and  verbalized agreement and understanding.   Pt. to have cardiac clearance with [facility], pt. is aware to make appt. for cardiac optimization, pt. and  verbalized agreement and understanding.   Pt. educated and instructed regarding all preoperative instructions and education as per policy via both verbal and written means of communication and pt. verbalized agreement and understanding.   Patient educated on surgical scrub, COVID testing-pt. states she has arrangements per policy and surgeon for preop COVID PCR, preadmission instructions, medical clearance and day of procedure medications as per policy,  pt. and  verbalized agreement and understanding.   Pt. instructed to stop vitamins/supplements/herbal medications/NSAIDS for one week prior to surgery and  pt. and  verbalized agreement and understanding.     OPIOID RISK TOOL    HENRY EACH BOX THAT APPLIES AND ADD TOTALS AT THE END    FAMILY HISTORY OF SUBSTANCE ABUSE                 FEMALE         MALE                                                Alcohol                             [  ]1 pt          [  ]3pts                                               Illegal Durgs                     [  ]2 pts        [  ]3pts                                               Rx Drugs                           [  ]4 pts        [  ]4 pts    PERSONAL HISTORY OF SUBSTANCE ABUSE                                                                                          Alcohol                             [  ]3 pts       [  ]3 pts                                               Illegal Drugs                     [  ]4 pts        [  ]4 pts                                               Rx Drugs                           [  ]5 pts        [  ]5 pts    AGE BETWEEN 16-45 YEARS                                      [  x]1 pt         [  ]1 pt    HISTORY OF PREADOLESCENT   SEXUAL ABUSE                                                             [ x ]3 pts        [  ]0pts    PSYCHOLOGICAL DISEASE                     ADD, OCD, Bipolar, Schizophrenia        [ x]2 pts         [  ]2 pts                      Depression                                               [ x ]1 pt           [  ]1 pt           SCORING TOTAL   (add numbers and type here)              (6)                                     A score of 3 or lower indicated LOW risk for future opioid abuse  A score of 4 to 7 indicated moderate risk for future opioid abuse  A score of 8 or higher indicates a high risk for opioid abuse    CAPRINI SCORE    AGE RELATED RISK FACTORS                                                             [ ] Age 41-60 years                                            (1 Point)  [ ] Age: 61-74 years                                           (2 Points)                 [ ] Age= 75 years                                                (3 Points)             DISEASE RELATED RISK FACTORS                                                       [ ] Edema in the lower extremities                 (1 Point)                     [ ] Varicose veins                                               (1 Point)                                 [x ] BMI > 25 Kg/m2                                            (1 Point)                                  [ ] Serious infection (ie PNA)                            (1 Point)                     [ ] Lung disease ( COPD, Emphysema)            (1 Point)                                                                          [ ] Acute myocardial infarction                         (1 Point)                  [ ] Congestive heart failure (in the previous month)  (1 Point)         [ ] Inflammatory bowel disease                            (1 Point)                  [ ] Central venous access, PICC or Port               (2 points)       (within the last month)                                                                [ ] Stroke (in the previous month)                        (5 Points)    [ ] Previous or present malignancy                       (2 points)                                                                                                                                                         HEMATOLOGY RELATED FACTORS                                                         [ ] Prior episodes of VTE                                     (3 Points)                     [ ] Positive family history for VTE                      (3 Points)                  [ ] Prothrombin 76748 A                                     (3 Points)                     [ ] Factor V Leiden                                                (3 Points)                        [ ] Lupus anticoagulants                                      (3 Points)                                                           [ ] Anticardiolipin antibodies                              (3 Points)                                                       [ ] High homocysteine in the blood                   (3 Points)                                             [ ] Other congenital or acquired thrombophilia      (3 Points)                                                [ ] Heparin induced thrombocytopenia                  (3 Points)                                        MOBILITY RELATED FACTORS  [ ] Bed rest                                                         (1 Point)  [ ] Plaster cast                                                    (2 points)  [ ] Bed bound for more than 72 hours           (2 Points)    GENDER SPECIFIC FACTORS  [ ] Pregnancy or had a baby within the last month   (1 Point)  [ ] Post-partum < 6 weeks                                   (1 Point)  [ ] Hormonal therapy  or oral contraception   (1 Point)  [ ] History of pregnancy complications              (1 point)  [ ] Unexplained or recurrent              (1 Point)    OTHER RISK FACTORS                                           (1 Point)  [x ] BMI >40, smoking, diabetes requiring insulin, chemotherapy  blood transfusions and length of surgery over 2 hours    SURGERY RELATED RISK FACTORS  [ ]  Section within the last month     (1 Point)  [ ] Minor surgery                                                  (1 Point)  [ ] Arthroscopic surgery                                       (2 Points)  [x ] Planned major surgery lasting more            (2 Points)      than 45 minutes     [ ] Elective hip or knee joint replacement       (5 points)       surgery                                                TRAUMA RELATED RISK FACTORS  [ ] Fracture of the hip, pelvis, or leg                       (5 Points)  [ ] Spinal cord injury resulting in paralysis             (5 points)       (in the previous month)    [ ] Paralysis  (less than 1 month)                             (5 Points)  [ ] Multiple Trauma within 1 month                        (5 Points)    Total Score [    4    ]    Caprini Score 0-2: Low Risk, NO VTE prophylaxis required for most patients, encourage ambulation  Caprini Score 3-6: Moderate Risk , pharmacologic VTE prophylaxis is indicated for most patients (in the absence of contraindications)  Caprini Score Greater than or =7: High risk, pharmocologic VTE prophylaxis indicated for most patients (in the absence of contraindications)                                   33 y/o Welsh/English speaking female presents today to PST pending laparoscopic right ovarian cystectomy possible oophorectomy and intrauterine device insertion secondary to benign neoplasm of right ovary, dysmenorrhea, endometriosis of uterus with Dr. Neeru Palomino 10/27/22. Pt. with history of morbid obesity, bipolar disorder and schizoaffective disorder, asthma - denies wheezing or cough, recent use of prn inhaler, does admit to SOB with minimal to moderate physical exertion, type 2 DM, HTN- not presently on medications she states provider took her off as she is having GI upset, NICOLAS not on CPAP, has a mouth guard but states she does not wear, constipation/diarrhea, GERD. Pt. states she was told that she has a cyst, states she has had a cyst in the past that took her in to the hospital and was told the cyst exploded and she was bleeding as a result. Pt. was told she again has a cyst that has hair and cyst but due to COVID the care and follow up was delayed, states she saw Dr. Palomino who told her the cyst was large in size and that it needs to be removed from ovary. Pt. right sided ovarian pain, states pain is intermittent, states 8/10 at times, described as coming with her period and being stronger with her period, pain lessens when period ends, denies previous treatments, states she gets pain on left side also at times. States sometimes she takes tylenol and aleve which helps the pain for short periods of time.  BMI 50.0. Admits to SOB with mild physical exertion like walking in to building today, admits to SOB with moderate physical exertion like climbing one flight of stairs, admits to feeling of tight chest due to SOB when climbing stairs. Denies CP with mild physical exertion.  Sinus tachycardia on .      States she speaks to her therapist each week and follows with psychiatry. States she had thoughts of suicide in the last week, states she has had ideas of committing suicide, states she has thought of taking multiple Trazadone and putting them in a  and taking them, denies being suicidal on exam today, denies active plans for suicide as described on exam today.  States she has spoken to her psychologist/psychiatrist about this in the past.  Emotional support provided to pt. and .   This was communicated to PCP office spoke to Dr. Rice via phone at 08:45AM and advised that pt. should be brought in to office for immediate evaluation, provider states she will follow up with the pt. Pt. provided Suicide hotline contact number [phone] and advised to utilize this hotline in the event active suicidal ideation recurs, and/or contact 911 for immediate evaluation, pt. verbalized agreement and understanding. SW consult ordered for DOS, email sent to SW Leena Corona, Corinne Aylward and Surgeon made aware. Message left for Psychiatry Betsy Vazquez and Alta Huerta with PST phone number requesting call back, office closed at time of call this AM.  09:16 Spoke to Psychiatry Betsy Johnson regarding the above who states she will contact the patient and provide the patient with DASH support number and have her checked in on frequently and closely. Kaci MS, FN-BC     Pt. to have medical clearance with Dr. Leah Borrego, pt. is aware to make appt. for medical optimization, pt. and  verbalized agreement and understanding.   Pt. to have cardiac clearance with [facility], pt. is aware to make appt. for cardiac optimization, pt. and  verbalized agreement and understanding.   Pt. educated and instructed regarding all preoperative instructions and education as per policy via both verbal and written means of communication and pt. verbalized agreement and understanding.   Patient educated on surgical scrub, COVID testing-pt. states she has arrangements per policy and surgeon for preop COVID PCR, preadmission instructions, medical clearance and day of procedure medications as per policy,  pt. and  verbalized agreement and understanding.   Pt. instructed to stop vitamins/supplements/herbal medications/NSAIDS for one week prior to surgery and  pt. and  verbalized agreement and understanding.     OPIOID RISK TOOL    HENRY EACH BOX THAT APPLIES AND ADD TOTALS AT THE END    FAMILY HISTORY OF SUBSTANCE ABUSE                 FEMALE         MALE                                                Alcohol                             [  ]1 pt          [  ]3pts                                               Illegal Durgs                     [  ]2 pts        [  ]3pts                                               Rx Drugs                           [  ]4 pts        [  ]4 pts    PERSONAL HISTORY OF SUBSTANCE ABUSE                                                                                          Alcohol                             [  ]3 pts       [  ]3 pts                                               Illegal Drugs                     [  ]4 pts        [  ]4 pts                                               Rx Drugs                           [  ]5 pts        [  ]5 pts    AGE BETWEEN 16-45 YEARS                                      [  x]1 pt         [  ]1 pt    HISTORY OF PREADOLESCENT   SEXUAL ABUSE                                                             [ x ]3 pts        [  ]0pts    PSYCHOLOGICAL DISEASE                     ADD, OCD, Bipolar, Schizophrenia        [ x]2 pts         [  ]2 pts                      Depression                                               [ x ]1 pt           [  ]1 pt           SCORING TOTAL   (add numbers and type here)              (6)                                     A score of 3 or lower indicated LOW risk for future opioid abuse  A score of 4 to 7 indicated moderate risk for future opioid abuse  A score of 8 or higher indicates a high risk for opioid abuse    CAPRINI SCORE    AGE RELATED RISK FACTORS                                                             [ ] Age 41-60 years                                            (1 Point)  [ ] Age: 61-74 years                                           (2 Points)                 [ ] Age= 75 years                                                (3 Points)             DISEASE RELATED RISK FACTORS                                                       [ ] Edema in the lower extremities                 (1 Point)                     [ ] Varicose veins                                               (1 Point)                                 [x ] BMI > 25 Kg/m2                                            (1 Point)                                  [ ] Serious infection (ie PNA)                            (1 Point)                     [ ] Lung disease ( COPD, Emphysema)            (1 Point)                                                                          [ ] Acute myocardial infarction                         (1 Point)                  [ ] Congestive heart failure (in the previous month)  (1 Point)         [ ] Inflammatory bowel disease                            (1 Point)                  [ ] Central venous access, PICC or Port               (2 points)       (within the last month)                                                                [ ] Stroke (in the previous month)                        (5 Points)    [ ] Previous or present malignancy                       (2 points)                                                                                                                                                         HEMATOLOGY RELATED FACTORS                                                         [ ] Prior episodes of VTE                                     (3 Points)                     [ ] Positive family history for VTE                      (3 Points)                  [ ] Prothrombin 01362 A                                     (3 Points)                     [ ] Factor V Leiden                                                (3 Points)                        [ ] Lupus anticoagulants                                      (3 Points)                                                           [ ] Anticardiolipin antibodies                              (3 Points)                                                       [ ] High homocysteine in the blood                   (3 Points)                                             [ ] Other congenital or acquired thrombophilia      (3 Points)                                                [ ] Heparin induced thrombocytopenia                  (3 Points)                                        MOBILITY RELATED FACTORS  [ ] Bed rest                                                         (1 Point)  [ ] Plaster cast                                                    (2 points)  [ ] Bed bound for more than 72 hours           (2 Points)    GENDER SPECIFIC FACTORS  [ ] Pregnancy or had a baby within the last month   (1 Point)  [ ] Post-partum < 6 weeks                                   (1 Point)  [ ] Hormonal therapy  or oral contraception   (1 Point)  [ ] History of pregnancy complications              (1 point)  [ ] Unexplained or recurrent              (1 Point)    OTHER RISK FACTORS                                           (1 Point)  [x ] BMI >40, smoking, diabetes requiring insulin, chemotherapy  blood transfusions and length of surgery over 2 hours    SURGERY RELATED RISK FACTORS  [ ]  Section within the last month     (1 Point)  [ ] Minor surgery                                                  (1 Point)  [ ] Arthroscopic surgery                                       (2 Points)  [x ] Planned major surgery lasting more            (2 Points)      than 45 minutes     [ ] Elective hip or knee joint replacement       (5 points)       surgery                                                TRAUMA RELATED RISK FACTORS  [ ] Fracture of the hip, pelvis, or leg                       (5 Points)  [ ] Spinal cord injury resulting in paralysis             (5 points)       (in the previous month)    [ ] Paralysis  (less than 1 month)                             (5 Points)  [ ] Multiple Trauma within 1 month                        (5 Points)    Total Score [    4    ]    Caprini Score 0-2: Low Risk, NO VTE prophylaxis required for most patients, encourage ambulation  Caprini Score 3-6: Moderate Risk , pharmacologic VTE prophylaxis is indicated for most patients (in the absence of contraindications)  Caprini Score Greater than or =7: High risk, pharmocologic VTE prophylaxis indicated for most patients (in the absence of contraindications)

## 2022-10-07 NOTE — H&P PST ADULT - CARDIOVASCULAR COMMENTS
Admits to SOB with mild physical exertion like walking in to building today, admits to SOB with moderate physical exertion like climbing one flight of stairs, admits to feeling of tight chest due to SOB when climbing stairs. Denies CP with mild physical exertion

## 2022-10-07 NOTE — H&P PST ADULT - PROBLEM SELECTOR PLAN 9
Medical clearance pending, PCP for medical management and follow up as indicated. Surgical team to follow.

## 2022-10-07 NOTE — H&P PST ADULT - NSICDXFAMILYHX_GEN_ALL_CORE_FT
FAMILY HISTORY:  Mother  Still living? Yes, Estimated age: Age Unknown  FH: cholecystectomy, Age at diagnosis: Age Unknown    Aunt  Still living? No  FH: lung disease, Age at diagnosis: Age Unknown

## 2022-10-07 NOTE — H&P PST ADULT - NSICDXPASTMEDICALHX_GEN_ALL_CORE_FT
PAST MEDICAL HISTORY:  Asthma     Benign neoplasm of right ovary     Bipolar disorder     Diabetes     Dysmenorrhea, unspecified     Endometriosis of uterus     Irritable bowel syndrome constipation/diarrhea    Morbid obesity     Obstructive sleep apnea No Cpap, has mouth guard but does not wear    Ovarian cyst     Schizoaffective disorder      PAST MEDICAL HISTORY:  Asthma     Benign neoplasm of right ovary     Bipolar disorder     Diabetes     Dysmenorrhea, unspecified     Endometriosis of uterus     Irritable bowel syndrome constipation/diarrhea    Lumbar herniated disc     Morbid obesity     Obstructive sleep apnea No Cpap, has mouth guard but does not wear    Ovarian cyst     Schizoaffective disorder

## 2022-10-07 NOTE — H&P PST ADULT - PSYCHIATRIC
details… normal/normal affect/alert and oriented x3/normal behavior/recent remote memory/anxious/depressed

## 2022-10-07 NOTE — H&P PST ADULT - NEGATIVE GASTROINTESTINAL SYMPTOMS
no change in bowel habits/no flatulence/no melena/no hematochezia/no steatorrhea/no jaundice/no hiccoughs

## 2022-10-07 NOTE — H&P PST ADULT - RESPIRATORY
normal/clear to auscultation bilaterally/no wheezes/no rales/no rhonchi/no use of accessory muscles/airway patent/breath sounds equal/good air movement/respirations non-labored/no intercostal retractions

## 2022-10-07 NOTE — H&P PST ADULT - OTHER CARE PROVIDERS
Dr. Leah Borrego [facility] [phone],  [facility], Endocrinology Dr. Tommy Mancia Dr. Leah Borrego [phone],  Cardiology [facility] [phone], [facility] Betsy Vazquez [id number]-[phone] and Psychology Alta Huerta , Endocrinology Dr. Tommy Mancia

## 2022-10-07 NOTE — H&P PST ADULT - NEGATIVE ENMT SYMPTOMS
no hearing difficulty/no ear pain/no tinnitus/no vertigo/no nasal congestion/no nasal discharge/no nasal obstruction/no post-nasal discharge/no nose bleeds/no recurrent cold sores/no abnormal taste sensation/no gum bleeding/no dry mouth/no throat pain/no dysphagia

## 2022-10-07 NOTE — H&P PST ADULT - PROBLEM SELECTOR PLAN 5
Pt's discharge delayed because of misunderstanding about whether [employer] rep needed to see pt before discharge.     Medical and cardiac clearance pending for  laparoscopic right ovarian cystectomy possible oophorectomy and intrauterine device insertion secondary to benign neoplasm of right ovary, dysmenorrhea, endometriosis of uterus with Dr. Neeru Palomino 10/27/22.

## 2022-10-08 ENCOUNTER — EMERGENCY (EMERGENCY)
Facility: HOSPITAL | Age: 32
LOS: 1 days | Discharge: HOME | End: 2022-10-08
Attending: EMERGENCY MEDICINE
Payer: COMMERCIAL

## 2022-10-08 VITALS
HEIGHT: 65 IN | DIASTOLIC BLOOD PRESSURE: 94 MMHG | OXYGEN SATURATION: 97 % | RESPIRATION RATE: 18 BRPM | TEMPERATURE: 98 F | WEIGHT: 293 LBS | SYSTOLIC BLOOD PRESSURE: 143 MMHG | HEART RATE: 129 BPM

## 2022-10-08 DIAGNOSIS — F25.1 SCHIZOAFFECTIVE DISORDER, DEPRESSIVE TYPE: ICD-10-CM

## 2022-10-08 DIAGNOSIS — Z78.9 OTHER SPECIFIED HEALTH STATUS: Chronic | ICD-10-CM

## 2022-10-08 PROBLEM — K58.9 IRRITABLE BOWEL SYNDROME WITHOUT DIARRHEA: Chronic | Status: ACTIVE | Noted: 2022-10-07

## 2022-10-08 PROBLEM — D27.0 BENIGN NEOPLASM OF RIGHT OVARY: Chronic | Status: ACTIVE | Noted: 2022-10-07

## 2022-10-08 PROBLEM — M51.26 OTHER INTERVERTEBRAL DISC DISPLACEMENT, LUMBAR REGION: Chronic | Status: ACTIVE | Noted: 2022-10-07

## 2022-10-08 PROBLEM — I10 ESSENTIAL (PRIMARY) HYPERTENSION: Chronic | Status: ACTIVE | Noted: 2022-10-07

## 2022-10-08 PROBLEM — K58.9 IRRITABLE BOWEL SYNDROME, UNSPECIFIED: Chronic | Status: ACTIVE | Noted: 2022-10-07

## 2022-10-08 PROBLEM — N80.00 ENDOMETRIOSIS OF THE UTERUS, UNSPECIFIED: Chronic | Status: ACTIVE | Noted: 2022-10-07

## 2022-10-08 PROBLEM — F31.9 BIPOLAR DISORDER, UNSPECIFIED: Chronic | Status: ACTIVE | Noted: 2022-10-07

## 2022-10-08 PROBLEM — N94.6 DYSMENORRHEA, UNSPECIFIED: Chronic | Status: ACTIVE | Noted: 2022-10-07

## 2022-10-08 PROBLEM — G47.33 OBSTRUCTIVE SLEEP APNEA (ADULT) (PEDIATRIC): Chronic | Status: ACTIVE | Noted: 2022-10-07

## 2022-10-08 PROBLEM — F25.9 SCHIZOAFFECTIVE DISORDER, UNSPECIFIED: Chronic | Status: ACTIVE | Noted: 2022-10-07

## 2022-10-08 PROBLEM — E66.01 MORBID (SEVERE) OBESITY DUE TO EXCESS CALORIES: Chronic | Status: ACTIVE | Noted: 2022-10-07

## 2022-10-08 LAB
ALBUMIN SERPL ELPH-MCNC: 3.8 G/DL — SIGNIFICANT CHANGE UP (ref 3.3–5.2)
ALP SERPL-CCNC: 105 U/L — SIGNIFICANT CHANGE UP (ref 40–120)
ALT FLD-CCNC: 29 U/L — SIGNIFICANT CHANGE UP
AMPHET UR-MCNC: NEGATIVE — SIGNIFICANT CHANGE UP
ANION GAP SERPL CALC-SCNC: 12 MMOL/L — SIGNIFICANT CHANGE UP (ref 5–17)
APAP SERPL-MCNC: <3 UG/ML — LOW (ref 10–26)
APPEARANCE UR: CLEAR — SIGNIFICANT CHANGE UP
AST SERPL-CCNC: 21 U/L — SIGNIFICANT CHANGE UP
BACTERIA # UR AUTO: ABNORMAL
BARBITURATES UR SCN-MCNC: NEGATIVE — SIGNIFICANT CHANGE UP
BASOPHILS # BLD AUTO: 0.06 K/UL — SIGNIFICANT CHANGE UP (ref 0–0.2)
BASOPHILS NFR BLD AUTO: 0.6 % — SIGNIFICANT CHANGE UP (ref 0–2)
BENZODIAZ UR-MCNC: NEGATIVE — SIGNIFICANT CHANGE UP
BILIRUB SERPL-MCNC: 0.3 MG/DL — LOW (ref 0.4–2)
BILIRUB UR-MCNC: NEGATIVE — SIGNIFICANT CHANGE UP
BUN SERPL-MCNC: 9.5 MG/DL — SIGNIFICANT CHANGE UP (ref 8–20)
CALCIUM SERPL-MCNC: 9.1 MG/DL — SIGNIFICANT CHANGE UP (ref 8.4–10.5)
CHLORIDE SERPL-SCNC: 100 MMOL/L — SIGNIFICANT CHANGE UP (ref 98–107)
CO2 SERPL-SCNC: 24 MMOL/L — SIGNIFICANT CHANGE UP (ref 22–29)
COCAINE METAB.OTHER UR-MCNC: NEGATIVE — SIGNIFICANT CHANGE UP
COLOR SPEC: YELLOW — SIGNIFICANT CHANGE UP
CREAT SERPL-MCNC: 0.59 MG/DL — SIGNIFICANT CHANGE UP (ref 0.5–1.3)
DIFF PNL FLD: ABNORMAL
EGFR: 123 ML/MIN/1.73M2 — SIGNIFICANT CHANGE UP
EOSINOPHIL # BLD AUTO: 0.12 K/UL — SIGNIFICANT CHANGE UP (ref 0–0.5)
EOSINOPHIL NFR BLD AUTO: 1.3 % — SIGNIFICANT CHANGE UP (ref 0–6)
EPI CELLS # UR: ABNORMAL
ETHANOL SERPL-MCNC: <10 MG/DL — SIGNIFICANT CHANGE UP (ref 0–9)
GLUCOSE SERPL-MCNC: 81 MG/DL — SIGNIFICANT CHANGE UP (ref 70–99)
GLUCOSE UR QL: NEGATIVE MG/DL — SIGNIFICANT CHANGE UP
HCT VFR BLD CALC: 41.1 % — SIGNIFICANT CHANGE UP (ref 34.5–45)
HGB BLD-MCNC: 13.5 G/DL — SIGNIFICANT CHANGE UP (ref 11.5–15.5)
IMM GRANULOCYTES NFR BLD AUTO: 0.4 % — SIGNIFICANT CHANGE UP (ref 0–0.9)
KETONES UR-MCNC: NEGATIVE — SIGNIFICANT CHANGE UP
LEUKOCYTE ESTERASE UR-ACNC: ABNORMAL
LYMPHOCYTES # BLD AUTO: 2.17 K/UL — SIGNIFICANT CHANGE UP (ref 1–3.3)
LYMPHOCYTES # BLD AUTO: 22.9 % — SIGNIFICANT CHANGE UP (ref 13–44)
MCHC RBC-ENTMCNC: 27.5 PG — SIGNIFICANT CHANGE UP (ref 27–34)
MCHC RBC-ENTMCNC: 32.8 GM/DL — SIGNIFICANT CHANGE UP (ref 32–36)
MCV RBC AUTO: 83.7 FL — SIGNIFICANT CHANGE UP (ref 80–100)
METHADONE UR-MCNC: NEGATIVE — SIGNIFICANT CHANGE UP
MONOCYTES # BLD AUTO: 0.75 K/UL — SIGNIFICANT CHANGE UP (ref 0–0.9)
MONOCYTES NFR BLD AUTO: 7.9 % — SIGNIFICANT CHANGE UP (ref 2–14)
NEUTROPHILS # BLD AUTO: 6.35 K/UL — SIGNIFICANT CHANGE UP (ref 1.8–7.4)
NEUTROPHILS NFR BLD AUTO: 66.9 % — SIGNIFICANT CHANGE UP (ref 43–77)
NITRITE UR-MCNC: NEGATIVE — SIGNIFICANT CHANGE UP
OPIATES UR-MCNC: POSITIVE
PCP SPEC-MCNC: SIGNIFICANT CHANGE UP
PCP UR-MCNC: NEGATIVE — SIGNIFICANT CHANGE UP
PH UR: 6 — SIGNIFICANT CHANGE UP (ref 5–8)
PLATELET # BLD AUTO: 450 K/UL — HIGH (ref 150–400)
POTASSIUM SERPL-MCNC: 3.7 MMOL/L — SIGNIFICANT CHANGE UP (ref 3.5–5.3)
POTASSIUM SERPL-SCNC: 3.7 MMOL/L — SIGNIFICANT CHANGE UP (ref 3.5–5.3)
PROT SERPL-MCNC: 7.9 G/DL — SIGNIFICANT CHANGE UP (ref 6.6–8.7)
PROT UR-MCNC: NEGATIVE — SIGNIFICANT CHANGE UP
RBC # BLD: 4.91 M/UL — SIGNIFICANT CHANGE UP (ref 3.8–5.2)
RBC # FLD: 13.2 % — SIGNIFICANT CHANGE UP (ref 10.3–14.5)
RBC CASTS # UR COMP ASSIST: SIGNIFICANT CHANGE UP /HPF (ref 0–4)
SALICYLATES SERPL-MCNC: <0.6 MG/DL — LOW (ref 10–20)
SARS-COV-2 RNA SPEC QL NAA+PROBE: SIGNIFICANT CHANGE UP
SODIUM SERPL-SCNC: 136 MMOL/L — SIGNIFICANT CHANGE UP (ref 135–145)
SP GR SPEC: 1.01 — SIGNIFICANT CHANGE UP (ref 1.01–1.02)
THC UR QL: NEGATIVE — SIGNIFICANT CHANGE UP
UROBILINOGEN FLD QL: NEGATIVE MG/DL — SIGNIFICANT CHANGE UP
WBC # BLD: 9.49 K/UL — SIGNIFICANT CHANGE UP (ref 3.8–10.5)
WBC # FLD AUTO: 9.49 K/UL — SIGNIFICANT CHANGE UP (ref 3.8–10.5)
WBC UR QL: SIGNIFICANT CHANGE UP /HPF (ref 0–5)

## 2022-10-08 PROCEDURE — 99220: CPT

## 2022-10-08 PROCEDURE — 80307 DRUG TEST PRSMV CHEM ANLYZR: CPT

## 2022-10-08 PROCEDURE — U0005: CPT

## 2022-10-08 PROCEDURE — 36415 COLL VENOUS BLD VENIPUNCTURE: CPT

## 2022-10-08 PROCEDURE — 93005 ELECTROCARDIOGRAM TRACING: CPT

## 2022-10-08 PROCEDURE — 99285 EMERGENCY DEPT VISIT HI MDM: CPT

## 2022-10-08 PROCEDURE — 85025 COMPLETE CBC W/AUTO DIFF WBC: CPT

## 2022-10-08 PROCEDURE — G0378: CPT

## 2022-10-08 PROCEDURE — 80053 COMPREHEN METABOLIC PANEL: CPT

## 2022-10-08 PROCEDURE — U0003: CPT

## 2022-10-08 PROCEDURE — 93010 ELECTROCARDIOGRAM REPORT: CPT

## 2022-10-08 PROCEDURE — 81001 URINALYSIS AUTO W/SCOPE: CPT

## 2022-10-08 RX ORDER — ACETAMINOPHEN 500 MG
975 TABLET ORAL ONCE
Refills: 0 | Status: COMPLETED | OUTPATIENT
Start: 2022-10-08 | End: 2022-10-08

## 2022-10-08 RX ADMIN — Medication 975 MILLIGRAM(S): at 19:47

## 2022-10-08 RX ADMIN — Medication 975 MILLIGRAM(S): at 14:46

## 2022-10-08 NOTE — ED PROVIDER NOTE - OBJECTIVE STATEMENT
32 year old female presents with depression. Pt reports that she is under a lot of stress, as her grandmother is very ill physically, her mother has significant mental illness, and she has a large complex cystic mass on her ovaries that she is scheduled to have surgery for. She is very depressed and having episodes of anxiety, but denies hallucinations, drug use.

## 2022-10-08 NOTE — ED ADULT NURSE NOTE - HPI (INCLUDE ILLNESS QUALITY, SEVERITY, DURATION, TIMING, CONTEXT, MODIFYING FACTORS, ASSOCIATED SIGNS AND SYMPTOMS)
pt reports she has SI for past few weeks due to worsening depression over the past few weeks. reports history of bipolar and schizoaffective disorder. reports the depression has gotten more severe due to multiple stressors in her life.  Report auditory hallucination. Repot Ron who talk to her and protecting her. Ron seems to be  the auditory voices patient is hearing. Patient reports her  psych meds make her sleep all day and she can not work. reports she has a mentally ill mother who refuses to get help. reports her mother is also primary caretaker for her ill grandmother and she ends up taking care of both of them due to her mothers mental illness. pt reports she is also due soon for a obstetrical surgery soon.  denies drug or etoh use pta. pt is sitting calm in bed. appropriate and cooperative. a and o x3. breathing even and unlabored. constant observation and  family at bedside. denies a/v/t hallucination. will continue to monitor.

## 2022-10-08 NOTE — ED ADULT NURSE NOTE - CHIEF COMPLAINT QUOTE
Pt BIBA ambulatory into triage from [facility], made statements of suicidal thoughts to therapist, has two plans, states there is a lot going on at home and therapist feels she isnt safe there, denies drinking alcohol/drug use today, hx of schizoaffective disorder and bipolar, calm and cooperative in triage

## 2022-10-08 NOTE — ED ADULT NURSE NOTE - OBJECTIVE STATEMENT
pt reports she has si for past few weeks due to worsening depression over the past few weeks. reports history of bipolar and schizoaffective disorder. reports the depression has gotten more severe due to multiple stressors in her life.  reports her  psych meds make her sleep all day and she can not work. reports she has a mentally ill mother who refuses to get help. reports her mother is also primary caretaker for her ill grandmother and she ends up taking care of both of them due to her mothers mental illness. pt reports she is also due soon for a obstetrical surgery soon.  denies drug or etoh use pta. pt is sitting calm in bed. appropriate and cooperative. a and o x3. breathing even and unlabored. constant observation and  family at bedside. denies a/v/t hallucination. will continue to monitor. pt reports she has SI for past few weeks due to worsening depression over the past few weeks. reports history of bipolar and schizoaffective disorder. reports the depression has gotten more severe due to multiple stressors in her life.  Report auditory hallucination. Repot Ron who talk to her and protecting her. Ron seems to be  the auditory voices patient is hearing. Patient reports her  psych meds make her sleep all day and she can not work. reports she has a mentally ill mother who refuses to get help. reports her mother is also primary caretaker for her ill grandmother and she ends up taking care of both of them due to her mothers mental illness. pt reports she is also due soon for a obstetrical surgery soon.  denies drug or etoh use pta. pt is sitting calm in bed. appropriate and cooperative. a and o x3. breathing even and unlabored. constant observation and  family at bedside. denies a/v/t hallucination. will continue to monitor.

## 2022-10-08 NOTE — ED ADULT NURSE NOTE - NS ED NURSE LEVEL OF CONSCIOUSNESS ORIENTATION
Oriented - self; Oriented - place; Oriented - time Oriented - self; Oriented - place; Oriented - time/Hallucination - audio/Ideation - suicidal
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
## 2022-10-08 NOTE — ED ADULT TRIAGE NOTE - CHIEF COMPLAINT QUOTE
Pt BIBA ambulatory into triage from [facility], made statements of suicidal thoughts to therapist, has two plans, states there is a lot going on at home and therapist feels she isnt safe there, denies drinking alcohol/drug use today, hx of schizoaffective disorder and bipolar, calm and cooperative in triage

## 2022-10-08 NOTE — ED BEHAVIORAL HEALTH ASSESSMENT NOTE - NSBHATTESTBILLING_PSY_A_CORE
08811-Tmlyzxideig diagnostic evaluation without medical services [id number]-Hgintducsf hospital care - moderate complexity 30-39 minutes

## 2022-10-08 NOTE — ED BEHAVIORAL HEALTH ASSESSMENT NOTE - PATIENT'S CHIEF COMPLAINT
"I was thinking about hurting myself last week, I told my therapist today, then she called 911 to bring me here".
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
## 2022-10-08 NOTE — ED ADULT NURSE REASSESSMENT NOTE - DESCRIPTION
assuming care for this pt from YENI Dunbar pt awake and oriented x 4. pt is anxious but able to control it. nad noted. will be transferred to  as per DESTINY Thompson,

## 2022-10-08 NOTE — ED PROVIDER NOTE - NSICDXPASTMEDICALHX_GEN_ALL_CORE_FT
PAST MEDICAL HISTORY:  Asthma     Benign neoplasm of right ovary     Bipolar disorder     Diabetes     Dysmenorrhea, unspecified     Endometriosis of uterus     Hypertension     Irritable bowel syndrome constipation/diarrhea    Lumbar herniated disc     Morbid obesity     Obstructive sleep apnea No Cpap, has mouth guard but does not wear    Ovarian cyst     Schizoaffective disorder

## 2022-10-08 NOTE — ED BEHAVIORAL HEALTH ASSESSMENT NOTE - DESCRIPTION
, domiciled with  and mother in law. Not currently working due to being disabled. Calm and cooperative.    Vital Signs Last 24 Hrs  T(C): 36.7 (08 Oct 2022 13:04), Max: 36.7 (08 Oct 2022 13:04)  T(F): 98.1 (08 Oct 2022 13:04), Max: 98.1 (08 Oct 2022 13:04)  HR: 129 (08 Oct 2022 13:04) (129 - 129)  BP: 143/94 (08 Oct 2022 13:04) (143/94 - 143/94)  BP(mean): --  RR: 18 (08 Oct 2022 13:04) (18 - 18)  SpO2: 97% (08 Oct 2022 13:04) (97% - 97%)    Parameters below as of 08 Oct 2022 13:04  Patient On (Oxygen Delivery Method): room air Diabetes, asthma, obesity, cyst at ovary.

## 2022-10-09 VITALS
HEART RATE: 97 BPM | SYSTOLIC BLOOD PRESSURE: 128 MMHG | RESPIRATION RATE: 18 BRPM | TEMPERATURE: 98 F | DIASTOLIC BLOOD PRESSURE: 84 MMHG | OXYGEN SATURATION: 99 %

## 2022-10-09 LAB
CULTURE RESULTS: SIGNIFICANT CHANGE UP
SPECIMEN SOURCE: SIGNIFICANT CHANGE UP

## 2022-10-09 PROCEDURE — 99282 EMERGENCY DEPT VISIT SF MDM: CPT

## 2022-10-09 PROCEDURE — 99217: CPT

## 2022-10-09 NOTE — ED CDU PROVIDER SUBSEQUENT DAY NOTE - PHYSICAL EXAMINATION
Gen: Well appearing in NAD  Head: NC/AT  Neck: trachea midline  CV: well perfused  Resp:  No distress  Ext: no deformities  Neuro:  A&O appears non focal  Skin:  Warm and dry as visualized

## 2022-10-09 NOTE — ED CDU PROVIDER DISPOSITION NOTE - NSDCPRINTRESULTS_ED_ALL_ED
Patient requests all Lab, Cardiology, and Radiology Results on their Discharge Instructions [see HPI] : see HPI [Heartburn] : heartburn [Negative] : Heme/Lymph [Shortness Of Breath] : no shortness of breath [Chest Pain] : no chest pain [Palpitations] : no palpitations
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
## 2022-10-09 NOTE — ED CDU PROVIDER SUBSEQUENT DAY NOTE - NEURO NEGATIVE STATEMENT, MLM
n/a no loss of consciousness, no gait abnormality, no headache, no sensory deficits, and no weakness.

## 2022-10-09 NOTE — ED CDU PROVIDER DISPOSITION NOTE - PATIENT PORTAL LINK FT
You can access the FollowMyHealth Patient Portal offered by [facility] by registering at the following website: [email or web address]. By joining [employer]’s FollowMyHealth portal, you will also be able to view your health information using other applications (apps) compatible with our system.

## 2022-10-09 NOTE — ED ADULT NURSE REASSESSMENT NOTE - NS ED NURSE REASSESS COMMENT FT1
Gave report to  nurse, security called to assist the pt. as per security pt can now go to 
Patient remain asleep but arousable, no complain voiced, all needs attend, safety maintained.
pt reports she has SI for past few weeks due to worsening depression over the past few weeks. reports history of bipolar and schizoaffective disorder. reports the depression has gotten more severe due to multiple stressors in her life.  Report auditory hallucination. Repot Ron who talk to her and protecting her. Ron seems to be  the auditory voices patient is hearing. Patient reports her  psych meds make her sleep all day and she can not work. reports she has a mentally ill mother who refuses to get help. reports her mother is also primary caretaker for her ill grandmother and she ends up taking care of both of them due to her mothers mental illness. pt reports she is also due soon for a obstetrical surgery soon.  denies drug or etoh use pta. pt is sitting calm in bed. appropriate and cooperative. a and o x3. breathing even and unlabored. constant observation and  family at bedside. denies /v/t hallucination. will continue to monitor.
see note
Patient in bed sleeping, safety maintained

## 2022-10-09 NOTE — CHART NOTE - NSCHARTNOTEFT_GEN_A_CORE
SW Note: Social work continues to follow patient in . Patient evaluated this morning. Per Dr. Diaz patient is treat and release and will follow up with her outpatient provider this upcoming Wednesday. Patient's spouse will be providing patient with transportation home. Patient with no additional needs or concerns at this time.

## 2023-04-20 NOTE — ED BEHAVIORAL HEALTH ASSESSMENT NOTE - NS ED BHA PLAN
Patient needs a refill on Xyzal and lovastatin sent to [facility]. The pharmacy received Celexa but lovastatin did not go through.      Rxs are ready to send     Last Appointment:  3/29/2023  Future Appointments   Date Time Provider Mya Quintana   8/17/2023  1:00 PM Rufino Mccormick MD [address] [place] [address]    '
Admit/Transfer to Inpatient Psychiatry

## 2023-04-23 ENCOUNTER — EMERGENCY (EMERGENCY)
Facility: HOSPITAL | Age: 33
LOS: 1 days | Discharge: LEFT BEFORE DISCHARGE | End: 2023-04-23
Attending: EMERGENCY MEDICINE
Payer: MEDICAID

## 2023-04-23 VITALS
HEART RATE: 94 BPM | RESPIRATION RATE: 20 BRPM | TEMPERATURE: 98 F | SYSTOLIC BLOOD PRESSURE: 134 MMHG | OXYGEN SATURATION: 96 % | DIASTOLIC BLOOD PRESSURE: 72 MMHG

## 2023-04-23 DIAGNOSIS — Z78.9 OTHER SPECIFIED HEALTH STATUS: Chronic | ICD-10-CM

## 2023-04-23 PROCEDURE — L9991: CPT

## 2023-07-16 ENCOUNTER — OFFICE (OUTPATIENT)
Dept: URBAN - METROPOLITAN AREA CLINIC 12 | Facility: CLINIC | Age: 33
Setting detail: OPHTHALMOLOGY
End: 2023-07-16
Payer: COMMERCIAL

## 2023-07-16 DIAGNOSIS — E11.9: ICD-10-CM

## 2023-07-16 DIAGNOSIS — H20.011: ICD-10-CM

## 2023-07-16 DIAGNOSIS — E11.3293: ICD-10-CM

## 2023-07-16 DIAGNOSIS — H35.033: ICD-10-CM

## 2023-07-16 PROCEDURE — 92250 FUNDUS PHOTOGRAPHY W/I&R: CPT | Performed by: STUDENT IN AN ORGANIZED HEALTH CARE EDUCATION/TRAINING PROGRAM

## 2023-07-16 PROCEDURE — 92014 COMPRE OPH EXAM EST PT 1/>: CPT | Performed by: STUDENT IN AN ORGANIZED HEALTH CARE EDUCATION/TRAINING PROGRAM

## 2023-07-16 ASSESSMENT — AXIALLENGTH_DERIVED
OS_AL: 23.8012
OD_AL: 24.3681
OS_AL: 24.0516
OD_AL: 24.525

## 2023-07-16 ASSESSMENT — TONOMETRY
OD_IOP_MMHG: 20
OS_IOP_MMHG: 21

## 2023-07-16 ASSESSMENT — KERATOMETRY
OD_AXISANGLE_DEGREES: 92
OS_K2POWER_DIOPTERS: 47.75
OD_K2POWER_DIOPTERS: 48.50
METHOD_AUTO_MANUAL: AUTO
OS_K1POWER_DIOPTERS: 44.75
OS_AXISANGLE_DEGREES: 90
OD_K1POWER_DIOPTERS: 44.75

## 2023-07-16 ASSESSMENT — REFRACTION_MANIFEST
OD_AXIS: 0
OD_CYLINDER: -4.50
OS_VA1: 20/25-2
OS_CYLINDER: -2.50
OS_AXIS: 0
OD_SPHERE: -3.00
OD_VA1: 20/25
OS_SPHERE: -2.50

## 2023-07-16 ASSESSMENT — REFRACTION_AUTOREFRACTION
OS_CYLINDER: -2.75
OS_SPHERE: -1.75
OS_AXIS: 1
OD_SPHERE: -2.75
OD_AXIS: 2
OD_CYLINDER: -4.25

## 2023-07-16 ASSESSMENT — LID EXAM ASSESSMENTS
OD_BLEPHARITIS: RLL RUL
OS_BLEPHARITIS: LLL LUL

## 2023-07-16 ASSESSMENT — TEAR BREAK UP TIME (TBUT)
OD_TBUT: 1+
OS_TBUT: 1+

## 2023-07-16 ASSESSMENT — REFRACTION_CURRENTRX
OD_SPHERE: -3.00
OD_CYLINDER: -4.00
OS_AXIS: 166
OS_CYLINDER: -2.50
OS_SPHERE: -2.00
OD_AXIS: 176
OD_OVR_VA: 20/
OS_VPRISM_DIRECTION: SV
OD_VPRISM_DIRECTION: SV
OD_AXIS: 001
OS_VPRISM_DIRECTION: SV
OS_OVR_VA: 20/
OD_OVR_VA: 20/
OS_SPHERE: -2.25
OD_CYLINDER: -3.50
OS_AXIS: 162
OD_SPHERE: -3.00
OD_VPRISM_DIRECTION: SV
OS_CYLINDER: -2.50
OS_OVR_VA: 20/

## 2023-07-16 ASSESSMENT — CONFRONTATIONAL VISUAL FIELD TEST (CVF)
OS_FINDINGS: FULL
OD_FINDINGS: FULL

## 2023-07-16 ASSESSMENT — PACHYMETRY
OD_CT_UM: 492
OS_CT_UM: 489
OS_CT_CORRECTION: 4
OD_CT_CORRECTION: 4

## 2023-07-16 ASSESSMENT — VISUAL ACUITY
OS_BCVA: 20/40-2
OD_BCVA: 20/30-2

## 2023-07-16 ASSESSMENT — SPHEQUIV_DERIVED
OD_SPHEQUIV: -4.875
OS_SPHEQUIV: -3.75
OD_SPHEQUIV: -5.25
OS_SPHEQUIV: -3.125

## 2023-07-20 ENCOUNTER — OFFICE (OUTPATIENT)
Dept: URBAN - METROPOLITAN AREA CLINIC 12 | Facility: CLINIC | Age: 33
Setting detail: OPHTHALMOLOGY
End: 2023-07-20
Payer: COMMERCIAL

## 2023-07-20 ENCOUNTER — RX ONLY (RX ONLY)
Age: 33
End: 2023-07-20

## 2023-07-20 DIAGNOSIS — H20.011: ICD-10-CM

## 2023-07-20 DIAGNOSIS — H04.123: ICD-10-CM

## 2023-07-20 PROCEDURE — 99213 OFFICE O/P EST LOW 20 MIN: CPT | Performed by: STUDENT IN AN ORGANIZED HEALTH CARE EDUCATION/TRAINING PROGRAM

## 2023-07-20 ASSESSMENT — REFRACTION_CURRENTRX
OD_OVR_VA: 20/
OD_OVR_VA: 20/
OS_SPHERE: -2.00
OS_CYLINDER: -2.00
OD_SPHERE: -3.00
OD_CYLINDER: -4.00
OS_SPHERE: -2.25
OD_SPHERE: -3.25
OD_AXIS: 001
OS_VPRISM_DIRECTION: SV
OS_AXIS: 7
OS_OVR_VA: 20/
OS_OVR_VA: 20/
OS_AXIS: 166
OD_AXIS: 11
OD_CYLINDER: -3.50
OS_CYLINDER: -2.50
OS_VPRISM_DIRECTION: SV
OD_VPRISM_DIRECTION: SV
OD_VPRISM_DIRECTION: SV

## 2023-07-20 ASSESSMENT — LID EXAM ASSESSMENTS
OD_BLEPHARITIS: RLL RUL
OS_BLEPHARITIS: LLL LUL

## 2023-07-20 ASSESSMENT — VISUAL ACUITY
OS_BCVA: 20/60
OD_BCVA: 20/50

## 2023-07-20 ASSESSMENT — REFRACTION_AUTOREFRACTION
OD_SPHERE: -2.75
OD_CYLINDER: -4.00
OS_CYLINDER: -2.50
OS_SPHERE: -1.75
OD_AXIS: 180
OS_AXIS: 176

## 2023-07-20 ASSESSMENT — TEAR BREAK UP TIME (TBUT)
OS_TBUT: 1+
OD_TBUT: 1+

## 2023-07-20 ASSESSMENT — CONFRONTATIONAL VISUAL FIELD TEST (CVF)
OD_FINDINGS: FULL
OS_FINDINGS: FULL

## 2023-07-20 ASSESSMENT — TONOMETRY
OD_IOP_MMHG: 16
OS_IOP_MMHG: 18

## 2023-07-20 ASSESSMENT — SPHEQUIV_DERIVED
OS_SPHEQUIV: -3.75
OD_SPHEQUIV: -4.75
OS_SPHEQUIV: -3
OD_SPHEQUIV: -5.25

## 2023-07-20 ASSESSMENT — AXIALLENGTH_DERIVED
OS_AL: 23.7052
OS_AL: 24.004
OD_AL: 24.2676
OD_AL: 24.4755

## 2023-07-20 ASSESSMENT — REFRACTION_MANIFEST
OD_AXIS: 0
OS_AXIS: 0
OS_CYLINDER: -2.50
OD_VA1: 20/25
OS_VA1: 20/25-2
OS_SPHERE: -2.50
OD_CYLINDER: -4.50
OD_SPHERE: -3.00

## 2023-07-20 ASSESSMENT — KERATOMETRY
OS_AXISANGLE_DEGREES: 87
OS_K2POWER_DIOPTERS: 47.75
OD_K1POWER_DIOPTERS: 45.00
OD_K2POWER_DIOPTERS: 48.50
OD_AXISANGLE_DEGREES: 92
METHOD_AUTO_MANUAL: AUTO
OS_K1POWER_DIOPTERS: 45.00

## 2023-07-20 ASSESSMENT — PACHYMETRY
OD_CT_UM: 492
OS_CT_UM: 489
OS_CT_CORRECTION: 4
OD_CT_CORRECTION: 4

## 2023-09-12 ENCOUNTER — OFFICE (OUTPATIENT)
Dept: URBAN - METROPOLITAN AREA CLINIC 12 | Facility: CLINIC | Age: 33
Setting detail: OPHTHALMOLOGY
End: 2023-09-12
Payer: COMMERCIAL

## 2023-09-12 VITALS — HEIGHT: 55 IN

## 2023-09-12 DIAGNOSIS — H04.123: ICD-10-CM

## 2023-09-12 DIAGNOSIS — H52.13: ICD-10-CM

## 2023-09-12 DIAGNOSIS — H18.613: ICD-10-CM

## 2023-09-12 DIAGNOSIS — H20.011: ICD-10-CM

## 2023-09-12 PROBLEM — H52.7 REFRACTIVE ERROR: Status: ACTIVE | Noted: 2023-09-12

## 2023-09-12 PROCEDURE — 92015 DETERMINE REFRACTIVE STATE: CPT | Performed by: STUDENT IN AN ORGANIZED HEALTH CARE EDUCATION/TRAINING PROGRAM

## 2023-09-12 PROCEDURE — 92014 COMPRE OPH EXAM EST PT 1/>: CPT | Performed by: STUDENT IN AN ORGANIZED HEALTH CARE EDUCATION/TRAINING PROGRAM

## 2023-09-12 ASSESSMENT — SPHEQUIV_DERIVED
OS_SPHEQUIV: -3.75
OS_SPHEQUIV: -3.625
OD_SPHEQUIV: -5.25
OD_SPHEQUIV: -5.125
OS_SPHEQUIV: -3.75
OD_SPHEQUIV: -5.25

## 2023-09-12 ASSESSMENT — KERATOMETRY
METHOD_AUTO_MANUAL: AUTO
OS_K1POWER_DIOPTERS: 45.00
OD_K2POWER_DIOPTERS: 48.50
OD_K1POWER_DIOPTERS: 45.00
OD_AXISANGLE_DEGREES: 093
OS_AXISANGLE_DEGREES: 090
OS_K2POWER_DIOPTERS: 47.75

## 2023-09-12 ASSESSMENT — REFRACTION_MANIFEST
OD_SPHERE: -3.00
OS_CYLINDER: -2.50
OD_AXIS: 180
OD_CYLINDER: -4.50
OD_VA1: 20/20
OS_VA1: 20/25-2
OS_CYLINDER: -3.00
OD_AXIS: 0
OD_VA1: 20/25
OS_VA1: 20/20
OS_AXIS: 180
OS_SPHERE: -2.25
OD_CYLINDER: -4.00
OD_SPHERE: -3.25
OS_SPHERE: -2.50
OS_AXIS: 0

## 2023-09-12 ASSESSMENT — PACHYMETRY
OS_CT_CORRECTION: 4
OD_CT_UM: 492
OS_CT_UM: 489
OD_CT_CORRECTION: 4

## 2023-09-12 ASSESSMENT — REFRACTION_AUTOREFRACTION
OD_CYLINDER: -4.25
OS_SPHERE: -2.25
OD_AXIS: 003
OS_AXIS: 179
OS_CYLINDER: -2.75
OD_SPHERE: -3.00

## 2023-09-12 ASSESSMENT — AXIALLENGTH_DERIVED
OD_AL: 24.4755
OD_AL: 24.4232
OS_AL: 24.004
OS_AL: 24.004
OD_AL: 24.4755
OS_AL: 23.9537

## 2023-09-12 ASSESSMENT — REFRACTION_CURRENTRX
OS_OVR_VA: 20/
OD_SPHERE: -3.00
OS_AXIS: 170
OD_CYLINDER: -4.00
OS_AXIS: 166
OD_VPRISM_DIRECTION: SV
OD_OVR_VA: 20/
OD_AXIS: 021
OS_OVR_VA: 20/
OD_CYLINDER: -3.50
OS_VPRISM_DIRECTION: SV
OD_VPRISM_DIRECTION: SV
OD_OVR_VA: 20/
OS_VPRISM_DIRECTION: SV
OS_SPHERE: -2.00
OD_SPHERE: -3.25
OS_CYLINDER: -2.50
OS_CYLINDER: -2.25
OD_AXIS: 001
OS_SPHERE: -2.25

## 2023-09-12 ASSESSMENT — CONFRONTATIONAL VISUAL FIELD TEST (CVF)
OS_FINDINGS: FULL
OD_FINDINGS: FULL

## 2023-09-12 ASSESSMENT — LID EXAM ASSESSMENTS
OD_BLEPHARITIS: RLL RUL
OS_BLEPHARITIS: LLL LUL

## 2023-09-12 ASSESSMENT — TONOMETRY
OS_IOP_MMHG: 16
OD_IOP_MMHG: 18

## 2023-09-12 ASSESSMENT — VISUAL ACUITY
OD_BCVA: 20/50+2
OS_BCVA: 20/50

## 2023-09-12 ASSESSMENT — TEAR BREAK UP TIME (TBUT)
OD_TBUT: 1+ 1 SEC
OS_TBUT: 1+ 1 SEC

## 2023-10-29 ENCOUNTER — EMERGENCY (EMERGENCY)
Facility: HOSPITAL | Age: 33
LOS: 1 days | Discharge: HOME | End: 2023-10-29
Attending: EMERGENCY MEDICINE
Payer: COMMERCIAL

## 2023-10-29 VITALS
RESPIRATION RATE: 18 BRPM | WEIGHT: 293 LBS | HEART RATE: 90 BPM | SYSTOLIC BLOOD PRESSURE: 137 MMHG | OXYGEN SATURATION: 100 % | DIASTOLIC BLOOD PRESSURE: 86 MMHG | TEMPERATURE: 98 F | HEIGHT: 66 IN

## 2023-10-29 DIAGNOSIS — Z78.9 OTHER SPECIFIED HEALTH STATUS: Chronic | ICD-10-CM

## 2023-10-29 LAB
ALBUMIN SERPL ELPH-MCNC: 4.1 G/DL — SIGNIFICANT CHANGE UP (ref 3.3–5.2)
ALBUMIN SERPL ELPH-MCNC: 4.1 G/DL — SIGNIFICANT CHANGE UP (ref 3.3–5.2)
ALP SERPL-CCNC: 81 U/L — SIGNIFICANT CHANGE UP (ref 40–120)
ALP SERPL-CCNC: 81 U/L — SIGNIFICANT CHANGE UP (ref 40–120)
ALT FLD-CCNC: 17 U/L — SIGNIFICANT CHANGE UP
ALT FLD-CCNC: 17 U/L — SIGNIFICANT CHANGE UP
ANION GAP SERPL CALC-SCNC: 11 MMOL/L — SIGNIFICANT CHANGE UP (ref 5–17)
ANION GAP SERPL CALC-SCNC: 11 MMOL/L — SIGNIFICANT CHANGE UP (ref 5–17)
APPEARANCE UR: CLEAR — SIGNIFICANT CHANGE UP
APPEARANCE UR: CLEAR — SIGNIFICANT CHANGE UP
AST SERPL-CCNC: 17 U/L — SIGNIFICANT CHANGE UP
AST SERPL-CCNC: 17 U/L — SIGNIFICANT CHANGE UP
BACTERIA # UR AUTO: ABNORMAL
BACTERIA # UR AUTO: ABNORMAL
BASOPHILS # BLD AUTO: 0.05 K/UL — SIGNIFICANT CHANGE UP (ref 0–0.2)
BASOPHILS # BLD AUTO: 0.05 K/UL — SIGNIFICANT CHANGE UP (ref 0–0.2)
BASOPHILS NFR BLD AUTO: 0.6 % — SIGNIFICANT CHANGE UP (ref 0–2)
BASOPHILS NFR BLD AUTO: 0.6 % — SIGNIFICANT CHANGE UP (ref 0–2)
BILIRUB SERPL-MCNC: 0.2 MG/DL — LOW (ref 0.4–2)
BILIRUB SERPL-MCNC: 0.2 MG/DL — LOW (ref 0.4–2)
BILIRUB UR-MCNC: NEGATIVE — SIGNIFICANT CHANGE UP
BILIRUB UR-MCNC: NEGATIVE — SIGNIFICANT CHANGE UP
BUN SERPL-MCNC: 12.3 MG/DL — SIGNIFICANT CHANGE UP (ref 8–20)
BUN SERPL-MCNC: 12.3 MG/DL — SIGNIFICANT CHANGE UP (ref 8–20)
CALCIUM SERPL-MCNC: 9 MG/DL — SIGNIFICANT CHANGE UP (ref 8.4–10.5)
CALCIUM SERPL-MCNC: 9 MG/DL — SIGNIFICANT CHANGE UP (ref 8.4–10.5)
CHLORIDE SERPL-SCNC: 100 MMOL/L — SIGNIFICANT CHANGE UP (ref 96–108)
CHLORIDE SERPL-SCNC: 100 MMOL/L — SIGNIFICANT CHANGE UP (ref 96–108)
CO2 SERPL-SCNC: 24 MMOL/L — SIGNIFICANT CHANGE UP (ref 22–29)
CO2 SERPL-SCNC: 24 MMOL/L — SIGNIFICANT CHANGE UP (ref 22–29)
COLOR SPEC: YELLOW — SIGNIFICANT CHANGE UP
COLOR SPEC: YELLOW — SIGNIFICANT CHANGE UP
CREAT SERPL-MCNC: 0.68 MG/DL — SIGNIFICANT CHANGE UP (ref 0.5–1.3)
CREAT SERPL-MCNC: 0.68 MG/DL — SIGNIFICANT CHANGE UP (ref 0.5–1.3)
DIFF PNL FLD: ABNORMAL
DIFF PNL FLD: ABNORMAL
EGFR: 119 ML/MIN/1.73M2 — SIGNIFICANT CHANGE UP
EGFR: 119 ML/MIN/1.73M2 — SIGNIFICANT CHANGE UP
EOSINOPHIL # BLD AUTO: 0.06 K/UL — SIGNIFICANT CHANGE UP (ref 0–0.5)
EOSINOPHIL # BLD AUTO: 0.06 K/UL — SIGNIFICANT CHANGE UP (ref 0–0.5)
EOSINOPHIL NFR BLD AUTO: 0.7 % — SIGNIFICANT CHANGE UP (ref 0–6)
EOSINOPHIL NFR BLD AUTO: 0.7 % — SIGNIFICANT CHANGE UP (ref 0–6)
EPI CELLS # UR: SIGNIFICANT CHANGE UP
EPI CELLS # UR: SIGNIFICANT CHANGE UP
GLUCOSE SERPL-MCNC: 95 MG/DL — SIGNIFICANT CHANGE UP (ref 70–99)
GLUCOSE SERPL-MCNC: 95 MG/DL — SIGNIFICANT CHANGE UP (ref 70–99)
GLUCOSE UR QL: NEGATIVE MG/DL — SIGNIFICANT CHANGE UP
GLUCOSE UR QL: NEGATIVE MG/DL — SIGNIFICANT CHANGE UP
HCG SERPL-ACNC: <4 MIU/ML — SIGNIFICANT CHANGE UP
HCG SERPL-ACNC: <4 MIU/ML — SIGNIFICANT CHANGE UP
HCT VFR BLD CALC: 41.2 % — SIGNIFICANT CHANGE UP (ref 34.5–45)
HCT VFR BLD CALC: 41.2 % — SIGNIFICANT CHANGE UP (ref 34.5–45)
HGB BLD-MCNC: 13.3 G/DL — SIGNIFICANT CHANGE UP (ref 11.5–15.5)
HGB BLD-MCNC: 13.3 G/DL — SIGNIFICANT CHANGE UP (ref 11.5–15.5)
IMM GRANULOCYTES NFR BLD AUTO: 0.4 % — SIGNIFICANT CHANGE UP (ref 0–0.9)
IMM GRANULOCYTES NFR BLD AUTO: 0.4 % — SIGNIFICANT CHANGE UP (ref 0–0.9)
KETONES UR-MCNC: NEGATIVE — SIGNIFICANT CHANGE UP
KETONES UR-MCNC: NEGATIVE — SIGNIFICANT CHANGE UP
LEUKOCYTE ESTERASE UR-ACNC: ABNORMAL
LEUKOCYTE ESTERASE UR-ACNC: ABNORMAL
LIDOCAIN IGE QN: 24 U/L — SIGNIFICANT CHANGE UP (ref 22–51)
LIDOCAIN IGE QN: 24 U/L — SIGNIFICANT CHANGE UP (ref 22–51)
LYMPHOCYTES # BLD AUTO: 2.13 K/UL — SIGNIFICANT CHANGE UP (ref 1–3.3)
LYMPHOCYTES # BLD AUTO: 2.13 K/UL — SIGNIFICANT CHANGE UP (ref 1–3.3)
LYMPHOCYTES # BLD AUTO: 26.4 % — SIGNIFICANT CHANGE UP (ref 13–44)
LYMPHOCYTES # BLD AUTO: 26.4 % — SIGNIFICANT CHANGE UP (ref 13–44)
MCHC RBC-ENTMCNC: 27.4 PG — SIGNIFICANT CHANGE UP (ref 27–34)
MCHC RBC-ENTMCNC: 27.4 PG — SIGNIFICANT CHANGE UP (ref 27–34)
MCHC RBC-ENTMCNC: 32.3 GM/DL — SIGNIFICANT CHANGE UP (ref 32–36)
MCHC RBC-ENTMCNC: 32.3 GM/DL — SIGNIFICANT CHANGE UP (ref 32–36)
MCV RBC AUTO: 84.9 FL — SIGNIFICANT CHANGE UP (ref 80–100)
MCV RBC AUTO: 84.9 FL — SIGNIFICANT CHANGE UP (ref 80–100)
MONOCYTES # BLD AUTO: 0.4 K/UL — SIGNIFICANT CHANGE UP (ref 0–0.9)
MONOCYTES # BLD AUTO: 0.4 K/UL — SIGNIFICANT CHANGE UP (ref 0–0.9)
MONOCYTES NFR BLD AUTO: 5 % — SIGNIFICANT CHANGE UP (ref 2–14)
MONOCYTES NFR BLD AUTO: 5 % — SIGNIFICANT CHANGE UP (ref 2–14)
NEUTROPHILS # BLD AUTO: 5.4 K/UL — SIGNIFICANT CHANGE UP (ref 1.8–7.4)
NEUTROPHILS # BLD AUTO: 5.4 K/UL — SIGNIFICANT CHANGE UP (ref 1.8–7.4)
NEUTROPHILS NFR BLD AUTO: 66.9 % — SIGNIFICANT CHANGE UP (ref 43–77)
NEUTROPHILS NFR BLD AUTO: 66.9 % — SIGNIFICANT CHANGE UP (ref 43–77)
NITRITE UR-MCNC: NEGATIVE — SIGNIFICANT CHANGE UP
NITRITE UR-MCNC: NEGATIVE — SIGNIFICANT CHANGE UP
PH UR: 6 — SIGNIFICANT CHANGE UP (ref 5–8)
PH UR: 6 — SIGNIFICANT CHANGE UP (ref 5–8)
PLATELET # BLD AUTO: 415 K/UL — HIGH (ref 150–400)
PLATELET # BLD AUTO: 415 K/UL — HIGH (ref 150–400)
POTASSIUM SERPL-MCNC: 4.1 MMOL/L — SIGNIFICANT CHANGE UP (ref 3.5–5.3)
POTASSIUM SERPL-MCNC: 4.1 MMOL/L — SIGNIFICANT CHANGE UP (ref 3.5–5.3)
POTASSIUM SERPL-SCNC: 4.1 MMOL/L — SIGNIFICANT CHANGE UP (ref 3.5–5.3)
POTASSIUM SERPL-SCNC: 4.1 MMOL/L — SIGNIFICANT CHANGE UP (ref 3.5–5.3)
PROT SERPL-MCNC: 7.7 G/DL — SIGNIFICANT CHANGE UP (ref 6.6–8.7)
PROT SERPL-MCNC: 7.7 G/DL — SIGNIFICANT CHANGE UP (ref 6.6–8.7)
PROT UR-MCNC: NEGATIVE — SIGNIFICANT CHANGE UP
PROT UR-MCNC: NEGATIVE — SIGNIFICANT CHANGE UP
RBC # BLD: 4.85 M/UL — SIGNIFICANT CHANGE UP (ref 3.8–5.2)
RBC # BLD: 4.85 M/UL — SIGNIFICANT CHANGE UP (ref 3.8–5.2)
RBC # FLD: 13.5 % — SIGNIFICANT CHANGE UP (ref 10.3–14.5)
RBC # FLD: 13.5 % — SIGNIFICANT CHANGE UP (ref 10.3–14.5)
RBC CASTS # UR COMP ASSIST: SIGNIFICANT CHANGE UP /HPF (ref 0–4)
RBC CASTS # UR COMP ASSIST: SIGNIFICANT CHANGE UP /HPF (ref 0–4)
SODIUM SERPL-SCNC: 135 MMOL/L — SIGNIFICANT CHANGE UP (ref 135–145)
SODIUM SERPL-SCNC: 135 MMOL/L — SIGNIFICANT CHANGE UP (ref 135–145)
SP GR SPEC: 1.02 — SIGNIFICANT CHANGE UP (ref 1.01–1.02)
SP GR SPEC: 1.02 — SIGNIFICANT CHANGE UP (ref 1.01–1.02)
UROBILINOGEN FLD QL: NEGATIVE MG/DL — SIGNIFICANT CHANGE UP
UROBILINOGEN FLD QL: NEGATIVE MG/DL — SIGNIFICANT CHANGE UP
WBC # BLD: 8.07 K/UL — SIGNIFICANT CHANGE UP (ref 3.8–10.5)
WBC # BLD: 8.07 K/UL — SIGNIFICANT CHANGE UP (ref 3.8–10.5)
WBC # FLD AUTO: 8.07 K/UL — SIGNIFICANT CHANGE UP (ref 3.8–10.5)
WBC # FLD AUTO: 8.07 K/UL — SIGNIFICANT CHANGE UP (ref 3.8–10.5)
WBC UR QL: SIGNIFICANT CHANGE UP /HPF (ref 0–5)
WBC UR QL: SIGNIFICANT CHANGE UP /HPF (ref 0–5)

## 2023-10-29 PROCEDURE — 76705 ECHO EXAM OF ABDOMEN: CPT | Mod: 26

## 2023-10-29 PROCEDURE — 76856 US EXAM PELVIC COMPLETE: CPT | Mod: 26

## 2023-10-29 PROCEDURE — 85025 COMPLETE CBC W/AUTO DIFF WBC: CPT

## 2023-10-29 PROCEDURE — 87086 URINE CULTURE/COLONY COUNT: CPT

## 2023-10-29 PROCEDURE — 81001 URINALYSIS AUTO W/SCOPE: CPT

## 2023-10-29 PROCEDURE — 80053 COMPREHEN METABOLIC PANEL: CPT

## 2023-10-29 PROCEDURE — 96375 TX/PRO/DX INJ NEW DRUG ADDON: CPT

## 2023-10-29 PROCEDURE — 84702 CHORIONIC GONADOTROPIN TEST: CPT

## 2023-10-29 PROCEDURE — 76830 TRANSVAGINAL US NON-OB: CPT | Mod: 26

## 2023-10-29 PROCEDURE — 76856 US EXAM PELVIC COMPLETE: CPT

## 2023-10-29 PROCEDURE — 36415 COLL VENOUS BLD VENIPUNCTURE: CPT

## 2023-10-29 PROCEDURE — 99285 EMERGENCY DEPT VISIT HI MDM: CPT | Mod: 25

## 2023-10-29 PROCEDURE — 83690 ASSAY OF LIPASE: CPT

## 2023-10-29 PROCEDURE — 99284 EMERGENCY DEPT VISIT MOD MDM: CPT | Mod: 25

## 2023-10-29 PROCEDURE — 76830 TRANSVAGINAL US NON-OB: CPT

## 2023-10-29 PROCEDURE — 76705 ECHO EXAM OF ABDOMEN: CPT

## 2023-10-29 PROCEDURE — 96374 THER/PROPH/DIAG INJ IV PUSH: CPT

## 2023-10-29 RX ORDER — ACETAMINOPHEN 500 MG
1000 TABLET ORAL ONCE
Refills: 0 | Status: COMPLETED | OUTPATIENT
Start: 2023-10-29 | End: 2023-10-29

## 2023-10-29 RX ORDER — ONDANSETRON 8 MG/1
4 TABLET, FILM COATED ORAL ONCE
Refills: 0 | Status: COMPLETED | OUTPATIENT
Start: 2023-10-29 | End: 2023-10-29

## 2023-10-29 RX ORDER — KETOROLAC TROMETHAMINE 30 MG/ML
30 SYRINGE (ML) INJECTION ONCE
Refills: 0 | Status: DISCONTINUED | OUTPATIENT
Start: 2023-10-29 | End: 2023-10-29

## 2023-10-29 RX ORDER — MORPHINE SULFATE 50 MG/1
4 CAPSULE, EXTENDED RELEASE ORAL ONCE
Refills: 0 | Status: DISCONTINUED | OUTPATIENT
Start: 2023-10-29 | End: 2023-10-29

## 2023-10-29 RX ADMIN — MORPHINE SULFATE 4 MILLIGRAM(S): 50 CAPSULE, EXTENDED RELEASE ORAL at 12:37

## 2023-10-29 RX ADMIN — Medication 400 MILLIGRAM(S): at 09:22

## 2023-10-29 RX ADMIN — Medication 30 MILLIGRAM(S): at 17:16

## 2023-10-29 RX ADMIN — Medication 1000 MILLIGRAM(S): at 10:23

## 2023-10-29 RX ADMIN — ONDANSETRON 4 MILLIGRAM(S): 8 TABLET, FILM COATED ORAL at 12:37

## 2023-10-29 NOTE — ED PROVIDER NOTE - CARE PROVIDER_API CALL
Neeru Palomino  Obstetrics and Gynecology  [address] [place] [address]  Phone: [phone]  Fax: [phone]  Follow Up Time: 1-3 Days    Roxy Mcdonald  Gastroenterology  [address] [facility] [facility] [address]  [place] [address]  Phone: [phone]  Fax: [phone]  Follow Up Time: 7-10 Days

## 2023-10-29 NOTE — ED PROVIDER NOTE - NS ED ATTENDING STATEMENT MOD
Attending Only This was a shared visit with the MOUSTAPHA. I reviewed and verified the documentation and independently performed the documented:

## 2023-10-29 NOTE — ED PROVIDER NOTE - NSICDXFAMILYHX_GEN_ALL_CORE_FT
CERTIFICATE OF WORK    March 2, 2020      Regarding: Agnes Grover  [address]  [address] [place] [address]      This is to certify that Agnes Grover has been under my care from3/2/2020 and can return to light work on 3/2/20    RESTRICTIONS: seated/desk work duty only, no prolonged standing or walking, no right knee squatting/bending/jumping activities, frequent rest as needed, no lifting, pushing, pulling more than 5-10 lbs, if unable to accommodate, will be off work until re-evaluated in 6-8 weeks time      REMARKS:         SIGNATURE:___________________________________________,   3/2/2020  Morro Huggins MD MPH         Orthopaedics  [address]  [address] [address] [place] [address]  [id number] [phone]   FAMILY HISTORY:  Mother  Still living? Yes, Estimated age: Age Unknown  FH: cholecystectomy, Age at diagnosis: Age Unknown    Aunt  Still living? No  FH: lung disease, Age at diagnosis: Age Unknown

## 2023-10-29 NOTE — ED PROVIDER NOTE - PHYSICAL EXAMINATION
Gen: NAD, AOx3  Head: NCAT  HEENT: oral mucosa moist, normal conjunctiva, oropharynx clear without exudate or erythema  Lung: CTAB, no respiratory distress, no wheezing, rales, rhonchi  CV: normal s1/s2, rrr, no murmurs, Normal perfusion, pulses 2+ throughout  Abd: soft, minimally tender right lower quadrant, tender in right upper quadrant, negative Campbell sign  MSK: No edema, no visible deformities, full range of motion in all 4 extremities  Neuro: No focal neurologic deficits  Skin: No rash   Psych: normal affect

## 2023-10-29 NOTE — ED PROVIDER NOTE - OBJECTIVE STATEMENT
Female with past medical history of schizoaffective disorder,  asthma, endometriosis, hypertension, morbid obesity, diabetes, presenting with right lower quadrant pain for several months.  Patient states that she had a CT abdomen pelvis yesterday which showed a  7 cm teratoma to right ovary.  She was told by her OB/GYN to go to the ED if she has worsening pain.  No nausea or vomiting, no fevers chills or urinary symptoms.

## 2023-10-29 NOTE — ED PROVIDER NOTE - CLINICAL SUMMARY MEDICAL DECISION MAKING FREE TEXT BOX
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  Plan to obtain transvaginal ultrasound rule out ovarian torsion, patient also with right upper quadrant tenderness plan to obtain ultrasound right upper quadrant evaluate for biliary colic, pain control, likely discussed with OB/GYN.

## 2023-10-29 NOTE — ED PROVIDER NOTE - PROVIDER TOKENS
PROVIDER:[TOKEN:[[id number]],FOLLOWUP:[1-3 Days]],PROVIDER:[TOKEN:[[id number]],FOLLOWUP:[7-10 Days]]

## 2023-10-29 NOTE — ED PROVIDER NOTE - PATIENT PORTAL LINK FT
You can access the FollowMyHealth Patient Portal offered by [facility] by registering at the following website: [email or web address]. By joining [employer]’s FollowMyHealth portal, you will also be able to view your health information using other applications (apps) compatible with our system.

## 2023-10-29 NOTE — ED PROVIDER NOTE - NSFOLLOWUPINSTRUCTIONS_ED_ALL_ED_FT
Please take motirn 600mg every 6 hours as needed for pain with food.  1)Follow up with your PCP in 1 week  2)Follow up with gastroenterologist in 1 week  3)Follow up with your GYN doctor in 2-3 days  Return to the emergency room if you are experiencing any new or worsening symptoms      SEEK IMMEDIATE MEDICAL CARE IF YOU HAVE ANY OF THE FOLLOWING SYMPTOMS: heavy vaginal bleeding, severe low back or abdominal cramps, fever/chills, nausea/vomiting, lightheadedness/dizziness, or fainting.

## 2023-10-29 NOTE — ED PROVIDER NOTE - ATTENDING APP SHARED VISIT CONTRIBUTION OF CARE
I, Georgette Whitney, performed a face to face bedside interview with this patient regarding history of present illness, review of symptoms and relevant past medical, social and family history.  I completed an independent physical examination. Medical decision making, follow-up on ordered tests (ie labs, radiologic studies) and re-evaluation of the patient's status has been communicated to the ACP.  Disposition of the patient will be based on test outcome and response to ED interventions.

## 2023-10-29 NOTE — ED ADULT TRIAGE NOTE - CHIEF COMPLAINT QUOTE
pt c/o abdominal pain , had a Cat scan yesterday DX with tumor in right ovary  A&OX3, resp wnl, c/o a lot of pain

## 2023-10-29 NOTE — ED PROVIDER NOTE - PROGRESS NOTE DETAILS
YAN Silverio: PT evaluated by intake physician. HPI/PE/ROS as noted above. Will follow up plan per intake physician YAN Silverio: labs reviewed with pt, no emergent findings. pt reporting persistent pain, morphine given. awaiting US pelvis and RUQ Patient noted to have large cystic structure to R ovary with possible leakage/rupture, ob/gyn consulted. Pt reports improvement of symptoms in the ED.  Labs reviewed notable for mildly elevated platelet 415  UA with trace LE/trace blood => most likely contaminated with bacteria. Denies any urinary symptoms.   RUQ: no acute findings. hepatic steatosis  US pelvis:   Complex solid and cystic right adnexal mass compatible with the known right ovarian dermoid/teratoma. Due to associated posterior acoustic shadowing, exact size of this mass is difficult to determine on this exam; however, this measures approximately 6.8 x 6.0 x 7.0 cm and appears to be slightly larger than on the prior CT. Although there is Doppler flow demonstrated within the peripheral ovarian tissue, in the setting of pelvic pain, associated ovarian torsion related to this mass cannot be excluded. Partial rupture or leakage of cyst contents could also not be excluded. Additional right ovarian cyst possibly representing an involuting corpus luteal cyst.  The left ovary is unremarkable.  The endometrium is poorly visualized on this examination and measures approximately 1.1 cm. No discrete focal abnormality identified. Gynecologic consultation is advised for further evaluation and management. Abdominal pelvic CT or MRI is also suggested for further assessment.    GYN consulted, cleared for discharge.   Instructed to f/u with PCP/GYN/GI in 2-3 days.  Strict ED return precautions given if any new or worsening symptoms.

## 2023-10-29 NOTE — CONSULT NOTE ADULT - ASSESSMENT
A/P: 33y G0 evaluated in the ED  A/P: 33y G0 evaluated in the ED for abdominal pain in the setting of known R sided dermoid cyst  - VSS  - Pt symptoms improved with pain medication in the ED   - Physical exam significant for diffuse moderate abdominal tenderness. No s/sx of surgical abdomen at this time  - Abdominal pain is likely multifactorial. However, given her pain and imaging results, we are unable to rule out ruptured dermoid cyst at this time which may be causing peritoneal irritation.   - Counseled patient on the option for surgical management with exploratory laparoscopy to visualize cyst and for possible cystectomy  - Patient reporting she has had significant mental distress surrounding the possibility of surgery A/P: 33y G0 evaluated in the ED for abdominal pain in the setting of known R sided dermoid cyst  - VSS  - Pt symptoms improved with pain medication in the ED   - Physical exam significant for diffuse moderate abdominal tenderness. No s/sx of surgical abdomen at this time  - Abdominal pain is likely multifactorial. However, given her pain and imaging results, we are unable to rule out ruptured dermoid cyst at this time which may be causing peritoneal irritation.   - Counseled patient on the option for surgical management with exploratory laparoscopy to visualize cyst and for possible cystectomy  - Patient reporting she has had significant mental distress surrounding the possibility of surgery and would like to delay surgery if possible   - Given that patient has no signs of surgical abdomen and is hemodynamically stable, emergency laparoscopy is not indicated at this time as pt is refusing surgery at this time  - Pt has already called her OBGYN to schedule an appointment to discuss management of increased cyst, advised pt to call office again tomorrow morning to schedule appointemtn  - Strict return precautions given     Discussed w/ Dr. Reed

## 2023-10-29 NOTE — ED ADULT TRIAGE NOTE - HEIGHT IN CM
167.64 Mucosal Advancement Flap Text: Given the location of the defect, shape of the defect and the proximity to free margins a mucosal advancement flap was deemed most appropriate. Incisions were made with a 15 blade scalpel in the appropriate fashion along the cutaneous vermilion border and the mucosal lip. The remaining actinically damaged mucosal tissue was excised.  The mucosal advancement flap was then elevated to the gingival sulcus with care taken to preserve the neurovascular structures and advanced into the primary defect. Care was taken to ensure that precise realignment of the vermilion border was achieved.

## 2023-10-29 NOTE — ED ADULT NURSE NOTE - OBJECTIVE STATEMENT
Pt c/o RLQ pain.  States pain has been intermittent x several months.  Pt reports having ct scan yesterday that showed an ovarian tumor.  Pain worsened today prompting her to come to ED. Reports accompanying nausea.

## 2023-10-29 NOTE — CONSULT NOTE ADULT - SUBJECTIVE AND OBJECTIVE BOX
HPI: 33y G0 w/ LMP ** presents with left lower abdominal pain and RUQ pain. Pt reports she has had RUQ and LLQ pain for the past 6 months that has worsened over the past 4 days. Pain is constant, dull pain which worsens with movement. She has been taking meloxicam at home for the pain with minimal relief. Pt also reports abdominal bloating over the past few days. Denies fever, chills, n/v/d, vaginal bleeding. She saw her PCP 3 days ago for her symptoms who sent her for CT A/P which was done yesterday and showed 7cm R sided dermoid cyst. She was diagnosed with this cyst a year ago and was offered cystectomy by her OBGYN at that time but pt declined as she does not want surgical intervention. Pt sees Dr. Palomino for GYN care.     PMH: T2DM, asthma, bipolar disorder, depression, schizoaffective disorder  PSH: denies  OB: G0  GYN: + h/o R ovarian cyst. denies fibroids, abn paps, STIs  Allergies: topamax, sulfa drugs, penicillin   MEDS: mounjaro, pioglitazone, proair      Vital Signs Last 24 Hrs  T(C): 36.7 (29 Oct 2023 07:55), Max: 36.7 (29 Oct 2023 07:55)  T(F): 98.1 (29 Oct 2023 07:55), Max: 98.1 (29 Oct 2023 07:55)  HR: 90 (29 Oct 2023 07:55) (90 - 90)  BP: 137/86 (29 Oct 2023 07:55) (137/86 - 137/86)  RR: 18 (29 Oct 2023 07:55) (18 - 18)  SpO2: 100% (29 Oct 2023 07:55) (100% - 100%)    Parameters below as of 29 Oct 2023 07:55  Patient On (Oxygen Delivery Method): room air         PHYSICAL EXAM:  GENERAL: A&Ox3, NAD  CHEST/LUNG: Breathing comfortably on RA  ABDOMEN: Soft, Nondistended; Moderate tenderness in RUQ, and LLQ>RLQ. No rebound or guarding. No rigidity. No masses palpated  EXTREMITIES:  2+ Peripheral Pulses, No clubbing, cyanosis, or edema  PELVIC:        EXTERNAL GENITALIA: Normal. No rashes or lesions noted.          CERVIX: closed to palpation no CMt noted.        UTERUS: normal size, nontender, mobile        ADNEXA: no adnexal masses or tenderness appreciated.      LABS:                        13.3   8.07  )-----------( 415      ( 29 Oct 2023 09:15 )             41.2     10-29    135  |  100  |  12.3  ----------------------------<  95  4.1   |  24.0  |  0.68    Ca    9.0      29 Oct 2023 09:15    TPro  7.7  /  Alb  4.1  /  TBili  0.2<L>  /  DBili  x   /  AST  17  /  ALT  17  /  AlkPhos  81  10-29    Urinalysis Basic - ( 29 Oct 2023 09:15 )    Color: x / Appearance: x / SG: x / pH: x  Gluc: 95 mg/dL / Ketone: x  / Bili: x / Urobili: x   Blood: x / Protein: x / Nitrite: x   Leuk Esterase: x / RBC: x / WBC x   Sq Epi: x / Non Sq Epi: x / Bacteria: x          RADIOLOGY STUDIES:    < from: US Pelvis Complete (10.29.23 @ 14:17) >  PROCEDURE DATE:  10/29/2023          INTERPRETATION:  CLINICAL INFORMATION: [id number] with history of   right ovarian teratoma, right lower quadrant pain    LMP: 10/14/2023    COMPARISON: Pelvic ultrasound 3/29/2019, abdominal pelvic CT 3/29/2019    TECHNIQUE:  Endovaginal and transabdominal pelvic sonogram. Color and Spectral   Doppler was performed.    FINDINGS:  Uterus: 8.4 cm x 3.7 cm x 4.5 cm. The uterus is anteverted. No fibroids   are identified.  Endometrium: 11 mm. Poorly delineated on the transvaginal examination.   Heterogeneous echotexture. No discrete focal finding identified.    Right ovary: Within the right adnexa, there is a mass measuring   approximately 6.8  x 6.0 x 7.0 cm, although the borders are ill-defined   and this is difficult to accurately measure. This mass has solid   echogenic components with associated posterior acoustic shadowing and    complex cystic component with linear and punctate echogenic foci. No   vascularity is demonstrated within this mass with color Doppler. This is   compatible with the known right ovarian dermoid/teratoma. Direct   correlation with the prior ultrasound is difficult. On the prior CT scan,   this mass measured approximately 4.8 x 4.5 x 4.8 cm; therefore, this may   be increased in size. There appears to be some peripheral ovarian tissue   at the edge ofthis mass, with arterial and venous flow demonstrated with   Doppler.. There is an additional cystic structure with irregular wall and    peripheral flow within the right ovary measuring 1.3 x 2.7 x 1.6 cm   possibly representing an involuting corpusluteum.    Left ovary: 2.2 cm x 2.0 cm x 2.2 cm. Within normal limits, containing   small follicles.. Normal arterial and venous waveforms.    Fluid: Small amount of free fluid in the cul-de-sac and right adnexa.    IMPRESSION:    1. Complex solid and cystic right adnexal mass compatible with the known   right ovarian dermoid/teratoma. Due to associated posterior acoustic   shadowing, exact size of this mass is difficult to determine on this   exam; however, this measures approximately 6.8 x 6.0 x 7.0 cm and appears   to be slightly larger than on the prior CT. Although there is Doppler   flow demonstrated within the peripheral ovarian tissue, in the setting of   pelvic pain, associated ovarian torsion related to this mass cannot be   excluded. Partial rupture or leakage of cyst contents could also not be   excluded. Additional right ovarian cyst possibly representing an   involuting corpus luteal cyst.  2. The left ovary is unremarkable.  3. The endometrium is poorly visualized on this examination and measures   approximately 1.1 cm. No discrete focal abnormality identified.    Gynecologic consultation is advised for further evaluation and   management. Abdominal pelvic CT or MRI is also suggested for further   assessment.    Findings were discussed with  [id number] 10/29/2023 3:00 PM by   Dr. Hills with read back confirmation.    --- End of Report ---            MICHAEL HILLS MD; Attending Radiologist  This document has been electronically signed. Oct 29 2023  3:05PM       HPI: 33y G0 presents with diffuse abdominal pain, worse in left lower and right upper quadrants. Pt reports she has had this pain for the past 6 months that has worsened over the past 4 days. Pain is constant, dull pain which worsens with movement. She has been taking meloxicam at home for the pain with minimal relief. Pt also reports abdominal bloating over the past few days with constipation and obstipation over the past 6 months. Denies fever, chills, n/v/d, vaginal bleeding. She saw her PCP 3 days ago for her symptoms who sent her for CT A/P which was done yesterday and showed 7cm R sided dermoid cyst but was otherwise normal. She was diagnosed with this cyst a year ago and was offered cystectomy by her OBGYN at that time but pt declined as she does not want surgical intervention. Pt sees Dr. Palomino for GYN care. She has received tylenol and morphine in the ED for pain control with significant relief. Pt has never seen a GI doc    PMH: T2DM, asthma, bipolar disorder, depression, schizoaffective disorder  PSH: denies  OB: G0  GYN: + h/o R ovarian cyst. denies fibroids, abn paps, STIs  Allergies: topamax, sulfa drugs, penicillin   MEDS: mounjaro, pioglitazone, proair      Vital Signs Last 24 Hrs  T(C): 36.7 (29 Oct 2023 07:55), Max: 36.7 (29 Oct 2023 07:55)  T(F): 98.1 (29 Oct 2023 07:55), Max: 98.1 (29 Oct 2023 07:55)  HR: 90 (29 Oct 2023 07:55) (90 - 90)  BP: 137/86 (29 Oct 2023 07:55) (137/86 - 137/86)  RR: 18 (29 Oct 2023 07:55) (18 - 18)  SpO2: 100% (29 Oct 2023 07:55) (100% - 100%)    Parameters below as of 29 Oct 2023 07:55  Patient On (Oxygen Delivery Method): room air         PHYSICAL EXAM:  GENERAL: A&Ox3, NAD  CHEST/LUNG: Breathing comfortably on RA  ABDOMEN: Soft, Nondistended; Diffuse tenderness, worse in RUQ, and LLQ>RLQ. No rebound or guarding. No rigidity. No masses palpated  EXTREMITIES:  2+ Peripheral Pulses, No clubbing, cyanosis, or edema  PELVIC:        EXTERNAL GENITALIA: Normal. No rashes or lesions noted.          CERVIX: closed to palpation no CMt noted.        UTERUS: normal size, nontender, mobile        ADNEXA: no adnexal masses appreciated.      LABS:                        13.3   8.07  )-----------( 415      ( 29 Oct 2023 09:15 )             41.2     10-29    135  |  100  |  12.3  ----------------------------<  95  4.1   |  24.0  |  0.68    Ca    9.0      29 Oct 2023 09:15    TPro  7.7  /  Alb  4.1  /  TBili  0.2<L>  /  DBili  x   /  AST  17  /  ALT  17  /  AlkPhos  81  10-29    Urinalysis Basic - ( 29 Oct 2023 09:15 )    Color: x / Appearance: x / SG: x / pH: x  Gluc: 95 mg/dL / Ketone: x  / Bili: x / Urobili: x   Blood: x / Protein: x / Nitrite: x   Leuk Esterase: x / RBC: x / WBC x   Sq Epi: x / Non Sq Epi: x / Bacteria: x          RADIOLOGY STUDIES:    < from: US Pelvis Complete (10.29.23 @ 14:17) >  PROCEDURE DATE:  10/29/2023          INTERPRETATION:  CLINICAL INFORMATION: [id number] with history of   right ovarian teratoma, right lower quadrant pain    LMP: 10/14/2023    COMPARISON: Pelvic ultrasound 3/29/2019, abdominal pelvic CT 3/29/2019    TECHNIQUE:  Endovaginal and transabdominal pelvic sonogram. Color and Spectral   Doppler was performed.    FINDINGS:  Uterus: 8.4 cm x 3.7 cm x 4.5 cm. The uterus is anteverted. No fibroids   are identified.  Endometrium: 11 mm. Poorly delineated on the transvaginal examination.   Heterogeneous echotexture. No discrete focal finding identified.    Right ovary: Within the right adnexa, there is a mass measuring   approximately 6.8  x 6.0 x 7.0 cm, although the borders are ill-defined   and this is difficult to accurately measure. This mass has solid   echogenic components with associated posterior acoustic shadowing and    complex cystic component with linear and punctate echogenic foci. No   vascularity is demonstrated within this mass with color Doppler. This is   compatible with the known right ovarian dermoid/teratoma. Direct   correlation with the prior ultrasound is difficult. On the prior CT scan,   this mass measured approximately 4.8 x 4.5 x 4.8 cm; therefore, this may   be increased in size. There appears to be some peripheral ovarian tissue   at the edge ofthis mass, with arterial and venous flow demonstrated with   Doppler.. There is an additional cystic structure with irregular wall and    peripheral flow within the right ovary measuring 1.3 x 2.7 x 1.6 cm   possibly representing an involuting corpusluteum.    Left ovary: 2.2 cm x 2.0 cm x 2.2 cm. Within normal limits, containing   small follicles.. Normal arterial and venous waveforms.    Fluid: Small amount of free fluid in the cul-de-sac and right adnexa.    IMPRESSION:    1. Complex solid and cystic right adnexal mass compatible with the known   right ovarian dermoid/teratoma. Due to associated posterior acoustic   shadowing, exact size of this mass is difficult to determine on this   exam; however, this measures approximately 6.8 x 6.0 x 7.0 cm and appears   to be slightly larger than on the prior CT. Although there is Doppler   flow demonstrated within the peripheral ovarian tissue, in the setting of   pelvic pain, associated ovarian torsion related to this mass cannot be   excluded. Partial rupture or leakage of cyst contents could also not be   excluded. Additional right ovarian cyst possibly representing an   involuting corpus luteal cyst.  2. The left ovary is unremarkable.  3. The endometrium is poorly visualized on this examination and measures   approximately 1.1 cm. No discrete focal abnormality identified.    Gynecologic consultation is advised for further evaluation and   management. Abdominal pelvic CT or MRI is also suggested for further   assessment.    Findings were discussed with  [id number] 10/29/2023 3:00 PM by   Dr. Hills with read back confirmation.    --- End of Report ---            MICHAEL HILLS MD; Attending Radiologist  This document has been electronically signed. Oct 29 2023  3:05PM

## 2023-10-30 LAB
CULTURE RESULTS: SIGNIFICANT CHANGE UP
CULTURE RESULTS: SIGNIFICANT CHANGE UP
SPECIMEN SOURCE: SIGNIFICANT CHANGE UP
SPECIMEN SOURCE: SIGNIFICANT CHANGE UP

## 2023-11-13 ENCOUNTER — APPOINTMENT (OUTPATIENT)
Dept: GASTROENTEROLOGY | Facility: CLINIC | Age: 33
End: 2023-11-13
Payer: MEDICAID

## 2023-11-13 VITALS
SYSTOLIC BLOOD PRESSURE: 135 MMHG | DIASTOLIC BLOOD PRESSURE: 67 MMHG | WEIGHT: 293 LBS | BODY MASS INDEX: 57.52 KG/M2 | HEART RATE: 92 BPM | HEIGHT: 60 IN | RESPIRATION RATE: 15 BRPM | OXYGEN SATURATION: 97 %

## 2023-11-13 DIAGNOSIS — R10.84 GENERALIZED ABDOMINAL PAIN: ICD-10-CM

## 2023-11-13 DIAGNOSIS — R10.13 EPIGASTRIC PAIN: ICD-10-CM

## 2023-11-13 DIAGNOSIS — R13.10 DYSPHAGIA, UNSPECIFIED: ICD-10-CM

## 2023-11-13 PROCEDURE — 99203 OFFICE O/P NEW LOW 30 MIN: CPT

## 2023-11-13 RX ORDER — WHEAT DEXTRIN/CALCIUM/ASPARTAM 3 G-200/6G
POWDER (GRAM) ORAL DAILY
Qty: 1 | Refills: 0 | Status: ACTIVE | COMMUNITY
Start: 2023-11-13 | End: 1900-01-01

## 2023-11-13 RX ORDER — FAMOTIDINE 40 MG/1
40 TABLET, FILM COATED ORAL DAILY
Qty: 30 | Refills: 5 | Status: ACTIVE | COMMUNITY
Start: 2023-11-13 | End: 1900-01-01

## 2023-11-13 RX ORDER — ERGOCALCIFEROL (VITAMIN D2) 10 MCG
10 MCG TABLET ORAL
Refills: 0 | Status: ACTIVE | COMMUNITY

## 2023-11-13 RX ORDER — TIRZEPATIDE 2.5 MG/.5ML
2.5 INJECTION, SOLUTION SUBCUTANEOUS
Refills: 0 | Status: ACTIVE | COMMUNITY

## 2023-11-13 RX ORDER — METFORMIN HYDROCHLORIDE 500 MG/1
500 TABLET, COATED ORAL
Refills: 0 | Status: DISCONTINUED | COMMUNITY
End: 2023-11-13

## 2023-11-13 RX ORDER — PIOGLITAZONE HYDROCHLORIDE 30 MG/1
30 TABLET ORAL
Refills: 0 | Status: ACTIVE | COMMUNITY

## 2023-11-13 RX ORDER — POLYETHYLENE GLYCOL 3350 17 G/17G
17 POWDER, FOR SOLUTION ORAL DAILY
Qty: 1 | Refills: 5 | Status: ACTIVE | COMMUNITY
Start: 2023-11-13 | End: 1900-01-01

## 2023-12-07 ENCOUNTER — APPOINTMENT (OUTPATIENT)
Dept: GASTROENTEROLOGY | Facility: CLINIC | Age: 33
End: 2023-12-07
Payer: MEDICAID

## 2023-12-07 VITALS
RESPIRATION RATE: 16 BRPM | DIASTOLIC BLOOD PRESSURE: 82 MMHG | SYSTOLIC BLOOD PRESSURE: 127 MMHG | WEIGHT: 293 LBS | OXYGEN SATURATION: 98 % | HEART RATE: 90 BPM | BODY MASS INDEX: 57.52 KG/M2 | HEIGHT: 60 IN

## 2023-12-07 DIAGNOSIS — Z09 ENCOUNTER FOR FOLLOW-UP EXAMINATION AFTER COMPLETED TREATMENT FOR CONDITIONS OTHER THAN MALIGNANT NEOPLASM: ICD-10-CM

## 2023-12-07 DIAGNOSIS — K21.9 GASTRO-ESOPHAGEAL REFLUX DISEASE W/OUT ESOPHAGITIS: ICD-10-CM

## 2023-12-07 DIAGNOSIS — R11.10 VOMITING, UNSPECIFIED: ICD-10-CM

## 2023-12-07 DIAGNOSIS — R11.0 NAUSEA: ICD-10-CM

## 2023-12-07 DIAGNOSIS — K59.09 OTHER CONSTIPATION: ICD-10-CM

## 2023-12-07 PROCEDURE — 99204 OFFICE O/P NEW MOD 45 MIN: CPT

## 2023-12-07 RX ORDER — POLYETHYLENE GLYCOL 3350 17 G/17G
17 POWDER, FOR SOLUTION ORAL
Qty: 238 | Refills: 0 | Status: ACTIVE | COMMUNITY
Start: 2023-12-07 | End: 1900-01-01

## 2023-12-11 RX ORDER — PANTOPRAZOLE 40 MG/1
40 TABLET, DELAYED RELEASE ORAL
Qty: 30 | Refills: 5 | Status: ACTIVE | COMMUNITY
Start: 2023-12-11 | End: 1900-01-01

## 2023-12-13 PROBLEM — M25.562 LEFT KNEE PAIN: Status: ACTIVE | Noted: 2023-12-13

## 2023-12-14 ENCOUNTER — APPOINTMENT (OUTPATIENT)
Dept: ORTHOPEDIC SURGERY | Facility: CLINIC | Age: 33
End: 2023-12-14

## 2023-12-14 DIAGNOSIS — M25.562 PAIN IN LEFT KNEE: ICD-10-CM

## 2023-12-18 NOTE — HISTORY OF PRESENT ILLNESS
[de-identified] : Brittni is a 32 y/o female that presents today for initial evaluation of left knee pain which began []

## 2023-12-18 NOTE — PHYSICAL EXAM
[de-identified] : GENERAL APPEARANCE: Well nourished and hydrated, pleasant, alert, and oriented x 3. Appears their stated age.  HEENT: Normocephalic, extraocular eye motion intact. Nasal septum midline. Oral cavity clear. External auditory canal clear.  RESPIRATORY: Breath sounds clear and audible in all lobes. No wheezing, No accessory muscle use. CARDIOVASCULAR: No apparent abnormalities. No lower leg edema. No varicosities. Pedal pulses are palpable. NEUROLOGIC: Sensation is normal, no muscle weakness in the upper or lower extremities. DERMATOLOGIC: No apparent skin lesions, moist, warm, no rash. SPINE: Cervical spine appears normal and moves freely; thoracic spine appears normal and moves freely; lumbosacral spine appears normal and moves freely, normal, nontender. MUSCULOSKELETAL: Hands, wrists, and elbows are normal and move freely, shoulders are normal and move freely.  PSYCHIATRIC: Oriented to person, place, and time, insight and judgement were intact and the affect was normal.  LEFT KNEE EXAM shows [] effusion, ROM is [] degrees, [] instability, [] with Marcel, [] joint line tenderness. Strength:   Quadriceps- []/5        Hamstrings- []/5; [] pain with resisted strength testing.  Specialized Tests:  LACHMANS- [] POSTERIOR DRAWER- [] [] INSTABILITY WITH VALGUS STRESS AT 0 AND 30 DEGREES OF FLEXION.  [] INSTABILITY WITH VARUS STRESS AT 0 AND 30 DEGREES OF FLEXION.  [] PATELLA APPREHENSION, NO PATELLA INSTABILITY [] PATELLA GRIND

## 2024-01-28 ENCOUNTER — NON-APPOINTMENT (OUTPATIENT)
Age: 34
End: 2024-01-28

## 2024-01-30 ENCOUNTER — OFFICE (OUTPATIENT)
Dept: URBAN - METROPOLITAN AREA CLINIC 12 | Facility: CLINIC | Age: 34
Setting detail: OPHTHALMOLOGY
End: 2024-01-30
Payer: COMMERCIAL

## 2024-01-30 DIAGNOSIS — H04.123: ICD-10-CM

## 2024-01-30 DIAGNOSIS — H18.613: ICD-10-CM

## 2024-01-30 DIAGNOSIS — H20.011: ICD-10-CM

## 2024-01-30 PROCEDURE — 92012 INTRM OPH EXAM EST PATIENT: CPT | Performed by: STUDENT IN AN ORGANIZED HEALTH CARE EDUCATION/TRAINING PROGRAM

## 2024-01-30 ASSESSMENT — SPHEQUIV_DERIVED
OS_SPHEQUIV: -3.875
OD_SPHEQUIV: -5.25
OS_SPHEQUIV: -3.75
OS_SPHEQUIV: -3.75
OD_SPHEQUIV: -5.375
OD_SPHEQUIV: -5.25

## 2024-01-30 ASSESSMENT — REFRACTION_CURRENTRX
OD_OVR_VA: 20/
OD_CYLINDER: -3.50
OD_SPHERE: -3.25
OD_AXIS: 021
OS_CYLINDER: -2.25
OD_VPRISM_DIRECTION: SV
OD_AXIS: 001
OD_SPHERE: -3.00
OS_SPHERE: -2.00
OS_CYLINDER: -2.50
OS_AXIS: 166
OS_OVR_VA: 20/
OS_VPRISM_DIRECTION: SV
OS_AXIS: 170
OD_CYLINDER: -4.00
OS_SPHERE: -2.25
OD_OVR_VA: 20/
OS_OVR_VA: 20/
OD_VPRISM_DIRECTION: SV
OS_VPRISM_DIRECTION: SV

## 2024-01-30 ASSESSMENT — REFRACTION_MANIFEST
OS_CYLINDER: -3.00
OD_CYLINDER: -4.50
OS_SPHERE: -2.25
OD_SPHERE: -3.25
OS_VA1: 20/25-2
OD_VA1: 20/25
OD_CYLINDER: -4.00
OS_VA1: 20/20
OS_AXIS: 180
OD_AXIS: 180
OD_VA1: 20/20
OS_AXIS: 0
OD_SPHERE: -3.00
OD_AXIS: 0
OS_SPHERE: -2.50
OS_CYLINDER: -2.50

## 2024-01-30 ASSESSMENT — REFRACTION_AUTOREFRACTION
OS_SPHERE: -2.50
OD_CYLINDER: -4.25
OS_CYLINDER: -2.75
OS_AXIS: 180
OD_AXIS: 180
OD_SPHERE: -3.25

## 2024-01-30 ASSESSMENT — CONFRONTATIONAL VISUAL FIELD TEST (CVF)
OD_FINDINGS: FULL
OS_FINDINGS: FULL

## 2024-01-30 ASSESSMENT — LID EXAM ASSESSMENTS
OD_BLEPHARITIS: RLL RUL
OS_BLEPHARITIS: LLL LUL

## 2024-03-20 ENCOUNTER — APPOINTMENT (OUTPATIENT)
Dept: GASTROENTEROLOGY | Facility: CLINIC | Age: 34
End: 2024-03-20

## 2024-04-15 ENCOUNTER — EMERGENCY (EMERGENCY)
Facility: HOSPITAL | Age: 34
LOS: 1 days | Discharge: LEFT BEFORE DISCHARGE | End: 2024-04-15
Payer: MEDICAID

## 2024-04-15 VITALS
SYSTOLIC BLOOD PRESSURE: 112 MMHG | DIASTOLIC BLOOD PRESSURE: 64 MMHG | RESPIRATION RATE: 16 BRPM | TEMPERATURE: 98 F | WEIGHT: 293 LBS | HEART RATE: 100 BPM | HEIGHT: 69 IN | OXYGEN SATURATION: 97 %

## 2024-04-15 DIAGNOSIS — Z53.21 PROCEDURE AND TREATMENT NOT CARRIED OUT DUE TO PATIENT LEAVING PRIOR TO BEING SEEN BY HEALTH CARE PROVIDER: ICD-10-CM

## 2024-04-15 DIAGNOSIS — R10.30 LOWER ABDOMINAL PAIN, UNSPECIFIED: ICD-10-CM

## 2024-04-15 DIAGNOSIS — Z78.9 OTHER SPECIFIED HEALTH STATUS: Chronic | ICD-10-CM

## 2024-04-15 PROCEDURE — L9991: CPT

## 2024-04-26 ENCOUNTER — APPOINTMENT (OUTPATIENT)
Dept: PULMONOLOGY | Facility: CLINIC | Age: 34
End: 2024-04-26

## 2024-11-14 NOTE — CHART NOTE - NSCHARTNOTESELECT_GEN_ALL_CORE
Event Note Patient called and reminded to have repeat blood draw as per Dr. Trivedi's message - explained importance of monitoring kidney function kayli. with last weeks labs elevated.  Patient stated that she is unable to do the labs due to \"too much going on\" - patient having cataract surgery next week with follow up the following week, and her  has to take her to her appts.  Dr. Trivedi aware.

## 2024-12-06 ENCOUNTER — APPOINTMENT (OUTPATIENT)
Age: 34
End: 2024-12-06

## 2025-01-04 NOTE — H&P PST ADULT - PROBLEM SELECTOR PLAN 6
Contacted and conveyed positive strep results to patient; verbalized understanding.     Bipolar disorder/Schizoaffective disorder. Medical clearance pending, PCP for medical management and follow up as indicated.

## 2025-03-04 ENCOUNTER — APPOINTMENT (OUTPATIENT)
Dept: GASTROENTEROLOGY | Facility: CLINIC | Age: 35
End: 2025-03-04

## 2025-06-29 ENCOUNTER — EMERGENCY (EMERGENCY)
Facility: HOSPITAL | Age: 35
LOS: 1 days | End: 2025-06-29
Attending: STUDENT IN AN ORGANIZED HEALTH CARE EDUCATION/TRAINING PROGRAM
Payer: COMMERCIAL

## 2025-06-29 VITALS
DIASTOLIC BLOOD PRESSURE: 75 MMHG | HEART RATE: 90 BPM | TEMPERATURE: 98 F | RESPIRATION RATE: 21 BRPM | SYSTOLIC BLOOD PRESSURE: 138 MMHG | OXYGEN SATURATION: 93 %

## 2025-06-29 VITALS
RESPIRATION RATE: 22 BRPM | TEMPERATURE: 99 F | OXYGEN SATURATION: 95 % | DIASTOLIC BLOOD PRESSURE: 106 MMHG | HEIGHT: 66 IN | SYSTOLIC BLOOD PRESSURE: 173 MMHG | WEIGHT: 293 LBS | HEART RATE: 117 BPM

## 2025-06-29 DIAGNOSIS — Z78.9 OTHER SPECIFIED HEALTH STATUS: Chronic | ICD-10-CM

## 2025-06-29 LAB
ALBUMIN SERPL ELPH-MCNC: 3.8 G/DL — SIGNIFICANT CHANGE UP (ref 3.3–5.2)
ALP SERPL-CCNC: 109 U/L — SIGNIFICANT CHANGE UP (ref 40–120)
ALT FLD-CCNC: 38 U/L — HIGH
ANION GAP SERPL CALC-SCNC: 15 MMOL/L — SIGNIFICANT CHANGE UP (ref 5–17)
AST SERPL-CCNC: 32 U/L — HIGH
BILIRUB SERPL-MCNC: <0.2 MG/DL — LOW (ref 0.4–2)
BUN SERPL-MCNC: 9.6 MG/DL — SIGNIFICANT CHANGE UP (ref 8–20)
CALCIUM SERPL-MCNC: 9.3 MG/DL — SIGNIFICANT CHANGE UP (ref 8.4–10.5)
CHLORIDE SERPL-SCNC: 103 MMOL/L — SIGNIFICANT CHANGE UP (ref 96–108)
CO2 SERPL-SCNC: 20 MMOL/L — LOW (ref 22–29)
CREAT SERPL-MCNC: 0.58 MG/DL — SIGNIFICANT CHANGE UP (ref 0.5–1.3)
D DIMER BLD IA.RAPID-MCNC: 255 NG/ML DDU — HIGH
EGFR: 122 ML/MIN/1.73M2 — SIGNIFICANT CHANGE UP
EGFR: 122 ML/MIN/1.73M2 — SIGNIFICANT CHANGE UP
FLUAV AG NPH QL: SIGNIFICANT CHANGE UP
FLUBV AG NPH QL: SIGNIFICANT CHANGE UP
GLUCOSE SERPL-MCNC: 158 MG/DL — HIGH (ref 70–99)
HCT VFR BLD CALC: 38.6 % — SIGNIFICANT CHANGE UP (ref 34.5–45)
HGB BLD-MCNC: 12.4 G/DL — SIGNIFICANT CHANGE UP (ref 11.5–15.5)
MCHC RBC-ENTMCNC: 26.3 PG — LOW (ref 27–34)
MCHC RBC-ENTMCNC: 32.1 G/DL — SIGNIFICANT CHANGE UP (ref 32–36)
MCV RBC AUTO: 81.8 FL — SIGNIFICANT CHANGE UP (ref 80–100)
NRBC # BLD AUTO: 0 K/UL — SIGNIFICANT CHANGE UP (ref 0–0)
NRBC # FLD: 0 K/UL — SIGNIFICANT CHANGE UP (ref 0–0)
NRBC BLD AUTO-RTO: 0 /100 WBCS — SIGNIFICANT CHANGE UP (ref 0–0)
PLATELET # BLD AUTO: 493 K/UL — HIGH (ref 150–400)
PMV BLD: 9.5 FL — SIGNIFICANT CHANGE UP (ref 7–13)
POTASSIUM SERPL-MCNC: 4.2 MMOL/L — SIGNIFICANT CHANGE UP (ref 3.5–5.3)
POTASSIUM SERPL-SCNC: 4.2 MMOL/L — SIGNIFICANT CHANGE UP (ref 3.5–5.3)
PROT SERPL-MCNC: 7.6 G/DL — SIGNIFICANT CHANGE UP (ref 6.6–8.7)
RBC # BLD: 4.72 M/UL — SIGNIFICANT CHANGE UP (ref 3.8–5.2)
RBC # FLD: 13.4 % — SIGNIFICANT CHANGE UP (ref 10.3–14.5)
RSV RNA NPH QL NAA+NON-PROBE: SIGNIFICANT CHANGE UP
SARS-COV-2 RNA SPEC QL NAA+PROBE: SIGNIFICANT CHANGE UP
SODIUM SERPL-SCNC: 138 MMOL/L — SIGNIFICANT CHANGE UP (ref 135–145)
SOURCE RESPIRATORY: SIGNIFICANT CHANGE UP
TROPONIN T, HIGH SENSITIVITY RESULT: <6 NG/L — SIGNIFICANT CHANGE UP (ref 0–51)
WBC # BLD: 10.7 K/UL — HIGH (ref 3.8–10.5)
WBC # FLD AUTO: 10.7 K/UL — HIGH (ref 3.8–10.5)

## 2025-06-29 PROCEDURE — 82962 GLUCOSE BLOOD TEST: CPT

## 2025-06-29 PROCEDURE — 99285 EMERGENCY DEPT VISIT HI MDM: CPT | Mod: 25

## 2025-06-29 PROCEDURE — 36415 COLL VENOUS BLD VENIPUNCTURE: CPT

## 2025-06-29 PROCEDURE — 84484 ASSAY OF TROPONIN QUANT: CPT

## 2025-06-29 PROCEDURE — 93005 ELECTROCARDIOGRAM TRACING: CPT

## 2025-06-29 PROCEDURE — 99285 EMERGENCY DEPT VISIT HI MDM: CPT

## 2025-06-29 PROCEDURE — 94640 AIRWAY INHALATION TREATMENT: CPT

## 2025-06-29 PROCEDURE — 85379 FIBRIN DEGRADATION QUANT: CPT

## 2025-06-29 PROCEDURE — 0241U: CPT

## 2025-06-29 PROCEDURE — 93010 ELECTROCARDIOGRAM REPORT: CPT

## 2025-06-29 PROCEDURE — 71275 CT ANGIOGRAPHY CHEST: CPT

## 2025-06-29 PROCEDURE — 87637 SARSCOV2&INF A&B&RSV AMP PRB: CPT

## 2025-06-29 PROCEDURE — 85027 COMPLETE CBC AUTOMATED: CPT

## 2025-06-29 PROCEDURE — 80053 COMPREHEN METABOLIC PANEL: CPT

## 2025-06-29 PROCEDURE — 96374 THER/PROPH/DIAG INJ IV PUSH: CPT | Mod: XU

## 2025-06-29 PROCEDURE — 71046 X-RAY EXAM CHEST 2 VIEWS: CPT

## 2025-06-29 PROCEDURE — 84702 CHORIONIC GONADOTROPIN TEST: CPT

## 2025-06-29 PROCEDURE — 71275 CT ANGIOGRAPHY CHEST: CPT | Mod: 26

## 2025-06-29 PROCEDURE — 71046 X-RAY EXAM CHEST 2 VIEWS: CPT | Mod: 26

## 2025-06-29 RX ORDER — ACETAMINOPHEN 500 MG/5ML
1000 LIQUID (ML) ORAL ONCE
Refills: 0 | Status: COMPLETED | OUTPATIENT
Start: 2025-06-29 | End: 2025-06-29

## 2025-06-29 RX ORDER — IPRATROPIUM BROMIDE AND ALBUTEROL SULFATE .5; 2.5 MG/3ML; MG/3ML
3 SOLUTION RESPIRATORY (INHALATION) EVERY 6 HOURS
Refills: 0 | Status: DISCONTINUED | OUTPATIENT
Start: 2025-06-29 | End: 2025-07-07

## 2025-06-29 RX ADMIN — Medication 400 MILLIGRAM(S): at 21:00

## 2025-06-29 RX ADMIN — IPRATROPIUM BROMIDE AND ALBUTEROL SULFATE 3 MILLILITER(S): .5; 2.5 SOLUTION RESPIRATORY (INHALATION) at 18:50

## 2025-06-29 NOTE — ED PROVIDER NOTE - PATIENT PORTAL LINK FT
You can access the FollowMyHealth Patient Portal offered by [facility] by registering at the following website: [email or web address]. By joining [employer]’s FollowMyHealth portal, you will also be able to view your health information using other applications (apps) compatible with our system.

## 2025-06-29 NOTE — ED PROVIDER NOTE - CLINICAL SUMMARY MEDICAL DECISION MAKING FREE TEXT BOX
hx dm obestity asthma with pluetic cp headahce nasuea and dizziness since today . Also cougha nd wheezing . mild wheezing on exam no distress . ekg sinus tach plan for labs , cta r/o PE ,cxr duonebs hx dm obestity asthma with pluetic cp headahce nasuea and dizziness since today . Also cough and wheezing . mild wheezing on exam no distress . ekg sinus tach plan for labs , cta r/o PE ,cxr duonebs

## 2025-06-29 NOTE — ED ADULT NURSE NOTE - NSFALLRISKINTERV_ED_ALL_ED
Assistance OOB with selected safe patient handling equipment if applicable/Assistance with ambulation/Communicate fall risk and risk factors to all staff, patient, and family/Encourage patient to sit up slowly, dangle for a short time, stand at bedside before walking/Monitor gait and stability/Orthostatic vital signs/Provide patient with walking aids/Provide visual cue: yellow wristband, yellow gown, etc/Reinforce activity limits and safety measures with patient and family/Call bell, personal items and telephone in reach/Instruct patient to call for assistance before getting out of bed/chair/stretcher/Non-slip footwear applied when patient is off stretcher/[place] to call system/Physically safe environment - no spills, clutter or unnecessary equipment/Purposeful Proactive Rounding/Room/bathroom lighting operational, light cord in reach

## 2025-06-29 NOTE — ED ADULT NURSE NOTE - OBJECTIVE STATEMENT
35 YO female presented to the ED with c/o SOB and chest tightness that started this afternoon. Patient stated " I was in the bathroom when I felt a strong smell of cocaine, I felt lightheaded and nauseous and moved out of the area. Then I felt chest tightness and could not catch my breath prompting me to come to ED." Denies vomiting, fever, chills, cough, congestion. EKG done, blood work sent, PMH DM on Metformin. Blood work sent, nebulizer treatment started

## 2025-06-29 NOTE — ED PROVIDER NOTE - PHYSICAL EXAMINATION
GENERAL: no acute distress, comfortably in bed  HEENT: Atraumatic, normocephalic, non-icteric, no JVD  NEURO:  A&Ox3, no focal deficits, moving all extremities spontaneously, no dysarthria,   LUNGS: mild wheezing bl  HEART:tachycardic regular rythym, no murmur appreciated  ABD: Soft, non-tender, non-distended,   EXTREMITIES: Nontender, no clubbing, cyanosis, or edema  SKIN: No rashes or lesions

## 2025-06-29 NOTE — ED ADULT NURSE REASSESSMENT NOTE - NS ED NURSE REASSESS COMMENT FT1
rec pt. from day shift. pt. is aaox4. states she has a headache. meds administered. no acute resp distress noted. pending ct. safety maintained.

## 2025-06-29 NOTE — ED PROVIDER NOTE - NSFOLLOWUPINSTRUCTIONS_ED_ALL_ED_FT
1) Follow up with your doctor in one week  2) Return to the ER for worsening or concerning symptoms  3) Take medication as prescribed        Acute Bronchitis, Adult       Acute bronchitis is sudden or acute swelling of the air tubes (bronchi) in the lungs. Acute bronchitis causes these tubes to fill with mucus, which can make it hard to breathe. It can also cause coughing or wheezing.    In adults, acute bronchitis usually goes away within 2 weeks. A cough caused by bronchitis may last up to 3 weeks. Smoking, allergies, and asthma can make the condition worse.    What are the causes?  This condition can be caused by germs and by substances that irritate the lungs, including:    Cold and flu viruses. The most common cause of this condition is the virus that causes the common cold.  Bacteria.  Substances that irritate the lungs, including:    Smoke from cigarettes and other forms of tobacco.  Dust and pollen.  Fumes from chemical products, gases, or burned fuel.  Other materials that pollute indoor or outdoor air.  Close contact with someone who has acute bronchitis.    What increases the risk?  The following factors may make you more likely to develop this condition:    A weak body's defense system, also called the immune system.  A condition that affects your lungs and breathing, such as asthma.    What are the signs or symptoms?  Common symptoms of this condition include:    Lung and breathing problems, such as:    Coughing. This may bring up clear, yellow, or green mucus from your lungs (sputum).  Wheezing.  Having too much mucus in your lungs (chest congestion).  Having shortness of breath.  A fever.  Chills.  Aches and pains, including:    Tightness in your chest and other body aches.  A sore throat.    How is this diagnosed?  This condition is usually diagnosed based on:    Your symptoms and medical history.  A physical exam.    You may also have other tests, including tests to rule out other conditions, such pneumonia. These tests include:    A test of lung function.  Test of a mucus sample to look for the presence of bacteria.  Tests to check the oxygen level in your blood.  Blood tests.  Chest X-ray.    How is this treated?  Most cases of acute bronchitis clear up over time without treatment. Your health care provider may recommend:    Drinking more fluids. This can thin your mucus, which may improve your breathing.  Taking a medicine for a fever or cough.  Using a device that gets medicine into your lungs (inhaler) to help improve breathing and control coughing.  Using a vaporizer or a humidifier. These are machines that add water to the air to help you breathe better.    Follow these instructions at home:      Activity    Get plenty of rest.  Return to your normal activities as told by your health care provider. Ask your health care provider what activities are safe for you.        Lifestyle    Drink enough fluid to keep your urine pale yellow.  Do not drink alcohol.  Do not use any products that contain nicotine or tobacco, such as cigarettes, e-cigarettes, and chewing tobacco. If you need help quitting, ask your health care provider. Be aware that:    Your bronchitis will get worse if you smoke or breathe in other people's smoke (secondhand smoke).  Your lungs will heal faster if you quit smoking.        General instructions     Take over-the-counter and prescription medicines only as told by your health care provider.  Use an inhaler, vaporizer, or humidifier as told by your health care provider.  If you have a sore throat, gargle with a salt-water mixture 3–4 times a day or as needed. To make a salt-water mixture, completely dissolve ½–1 tsp (3–6 g) of salt in 1 cup (237 mL) of warm water.  Keep all follow-up visits as told by your health care provider. This is important.    How is this prevented?     To lower your risk of getting this condition again:    Wash your hands often with soap and water. If soap and water are not available, use hand .  Avoid contact with people who have cold symptoms.  Try not to touch your mouth, nose, or eyes with your hands.  Avoid places where there are fumes from chemicals. Breathing these fumes will make your condition worse.  Get the flu shot every year.    Contact a health care provider if:  Your symptoms do not improve after 2 weeks of treatment.  You vomit more than once or twice.  You have symptoms of dehydration such as:    Dark urine.  Dry skin or eyes.  Increased thirst.  Headaches.  Confusion.  Muscle cramps.    Get help right away if you:  Cough up blood.  Feel pain in your chest.  Have severe shortness of breath.  Faint or keep feeling like you are going to faint.  Have a severe headache.  Have fever or chills that get worse.    These symptoms may represent a serious problem that is an emergency. Do not wait to see if the symptoms will go away. Get medical help right away. Call your local emergency services (911 in the [place] Do not drive yourself to the hospital.    Summary  Acute bronchitis is sudden (acute) inflammation of the air tubes (bronchi) between the windpipe and the lungs. In adults, acute bronchitis usually goes away within 2 weeks, although coughing may last 3 weeks or longer  Take over-the-counter and prescription medicines only as told by your health care provider.  Drink enough fluid to keep your urine pale yellow.  Contact a health care provider if your symptoms do not improve after 2 weeks of treatment.  Get help right away if you cough up blood, faint, or have chest pain or shortness of breath.    ADDITIONAL NOTES AND INSTRUCTIONS    Please follow up with your Primary MD in 24-48 hr.  Seek immediate medical care for any new/worsening signs or symptoms.     Document Released: 1/25/2006 Document Revised: 8/31/2020 Document Reviewed: 7/10/2020  [employer] Patient Education ©2019 [employer] This information is not intended to replace advice given to you by your health care provider. Make sure you discuss any questions you have with your health care provider.

## 2025-06-30 RX ORDER — PREDNISONE 20 MG/1
1 TABLET ORAL
Refills: 0
Start: 2025-06-30

## 2025-06-30 RX ORDER — ALBUTEROL SULFATE 2.5 MG/3ML
2 VIAL, NEBULIZER (ML) INHALATION
Refills: 0
Start: 2025-06-30

## 2025-06-30 RX ORDER — ALBUTEROL SULFATE 2.5 MG/3ML
2 VIAL, NEBULIZER (ML) INHALATION
Qty: 1 | Refills: 0
Start: 2025-06-30 | End: 2025-07-04

## 2025-06-30 RX ADMIN — Medication 1000 MILLIGRAM(S): at 02:39

## 2025-07-05 DIAGNOSIS — R06.02 SHORTNESS OF BREATH: ICD-10-CM

## 2025-07-05 DIAGNOSIS — R00.0 TACHYCARDIA, UNSPECIFIED: ICD-10-CM

## 2025-07-05 DIAGNOSIS — Z88.0 ALLERGY STATUS TO PENICILLIN: ICD-10-CM

## 2025-07-05 DIAGNOSIS — Z88.1 ALLERGY STATUS TO OTHER ANTIBIOTIC AGENTS: ICD-10-CM

## 2025-07-05 DIAGNOSIS — Z88.2 ALLERGY STATUS TO SULFONAMIDES: ICD-10-CM

## 2025-07-05 DIAGNOSIS — E66.9 OBESITY, UNSPECIFIED: ICD-10-CM

## 2025-07-05 DIAGNOSIS — J45.909 UNSPECIFIED ASTHMA, UNCOMPLICATED: ICD-10-CM

## 2025-08-21 ENCOUNTER — APPOINTMENT (OUTPATIENT)
Dept: GASTROENTEROLOGY | Facility: CLINIC | Age: 35
End: 2025-08-21

## 2025-08-21 VITALS
HEIGHT: 67 IN | TEMPERATURE: 97.4 F | HEART RATE: 100 BPM | RESPIRATION RATE: 14 BRPM | DIASTOLIC BLOOD PRESSURE: 105 MMHG | OXYGEN SATURATION: 98 % | BODY MASS INDEX: 45.99 KG/M2 | SYSTOLIC BLOOD PRESSURE: 127 MMHG | WEIGHT: 293 LBS

## 2025-08-21 DIAGNOSIS — R21 RASH AND OTHER NONSPECIFIC SKIN ERUPTION: ICD-10-CM

## 2025-08-21 DIAGNOSIS — R19.5 OTHER FECAL ABNORMALITIES: ICD-10-CM

## 2025-08-21 DIAGNOSIS — L29.9 PRURITUS, UNSPECIFIED: ICD-10-CM

## 2025-08-21 DIAGNOSIS — R19.8 OTHER SPECIFIED SYMPTOMS AND SIGNS INVOLVING THE DIGESTIVE SYSTEM AND ABDOMEN: ICD-10-CM

## 2025-08-21 DIAGNOSIS — K21.9 GASTRO-ESOPHAGEAL REFLUX DISEASE W/OUT ESOPHAGITIS: ICD-10-CM

## 2025-08-21 DIAGNOSIS — Z71.9 COUNSELING, UNSPECIFIED: ICD-10-CM

## 2025-08-21 DIAGNOSIS — R10.813 RIGHT LOWER QUADRANT ABDOMINAL TENDERNESS: ICD-10-CM

## 2025-08-21 DIAGNOSIS — R11.10 VOMITING, UNSPECIFIED: ICD-10-CM

## 2025-08-21 RX ORDER — WHEAT DEXTRIN 3 G/4 G
POWDER (GRAM) ORAL
Qty: 1 | Refills: 0 | Status: ACTIVE | COMMUNITY
Start: 2025-08-21 | End: 1900-01-01

## 2025-08-21 RX ORDER — GLIMEPIRIDE 3 MG/1
TABLET ORAL
Refills: 0 | Status: ACTIVE | COMMUNITY

## 2025-08-21 RX ORDER — SODIUM SULFATE, POTASSIUM SULFATE AND MAGNESIUM SULFATE 1.6; 3.13; 17.5 G/177ML; G/177ML; G/177ML
17.5-3.13-1.6 SOLUTION ORAL
Qty: 1 | Refills: 0 | Status: ACTIVE | COMMUNITY
Start: 2025-08-21 | End: 1900-01-01

## 2025-08-21 RX ORDER — METFORMIN HYDROCHLORIDE 750 MG/1
TABLET ORAL
Refills: 0 | Status: ACTIVE | COMMUNITY

## 2025-08-21 RX ORDER — PANTOPRAZOLE 40 MG/1
40 TABLET, DELAYED RELEASE ORAL
Qty: 30 | Refills: 11 | Status: ACTIVE | COMMUNITY
Start: 2025-08-21 | End: 1900-01-01